# Patient Record
Sex: FEMALE | Race: ASIAN | NOT HISPANIC OR LATINO | Employment: FULL TIME | URBAN - METROPOLITAN AREA
[De-identification: names, ages, dates, MRNs, and addresses within clinical notes are randomized per-mention and may not be internally consistent; named-entity substitution may affect disease eponyms.]

---

## 2017-10-24 ENCOUNTER — OFFICE VISIT (OUTPATIENT)
Dept: FAMILY MEDICINE | Facility: CLINIC | Age: 32
End: 2017-10-24
Payer: OTHER GOVERNMENT

## 2017-10-24 ENCOUNTER — LAB VISIT (OUTPATIENT)
Dept: LAB | Facility: HOSPITAL | Age: 32
End: 2017-10-24
Attending: FAMILY MEDICINE
Payer: OTHER GOVERNMENT

## 2017-10-24 VITALS
WEIGHT: 104.19 LBS | SYSTOLIC BLOOD PRESSURE: 104 MMHG | HEART RATE: 56 BPM | BODY MASS INDEX: 19.67 KG/M2 | TEMPERATURE: 98 F | DIASTOLIC BLOOD PRESSURE: 76 MMHG | HEIGHT: 61 IN | OXYGEN SATURATION: 99 %

## 2017-10-24 DIAGNOSIS — Z01.419 ENCOUNTER FOR GYNECOLOGICAL EXAMINATION WITHOUT ABNORMAL FINDING: ICD-10-CM

## 2017-10-24 DIAGNOSIS — J31.0 RHINITIS, UNSPECIFIED CHRONICITY, UNSPECIFIED TYPE: ICD-10-CM

## 2017-10-24 DIAGNOSIS — Z00.00 ROUTINE PHYSICAL EXAMINATION: Primary | ICD-10-CM

## 2017-10-24 DIAGNOSIS — Z00.00 ROUTINE PHYSICAL EXAMINATION: ICD-10-CM

## 2017-10-24 DIAGNOSIS — Z23 NEED FOR IMMUNIZATION AGAINST INFLUENZA: ICD-10-CM

## 2017-10-24 LAB
ALBUMIN SERPL BCP-MCNC: 3.6 G/DL
ALP SERPL-CCNC: 30 U/L
ALT SERPL W/O P-5'-P-CCNC: 11 U/L
ANION GAP SERPL CALC-SCNC: 5 MMOL/L
AST SERPL-CCNC: 17 U/L
BASOPHILS # BLD AUTO: 0.04 K/UL
BASOPHILS NFR BLD: 0.9 %
BILIRUB SERPL-MCNC: 0.4 MG/DL
BUN SERPL-MCNC: 16 MG/DL
CALCIUM SERPL-MCNC: 8.8 MG/DL
CHLORIDE SERPL-SCNC: 111 MMOL/L
CHOLEST SERPL-MCNC: 160 MG/DL
CHOLEST/HDLC SERPL: 2.5 {RATIO}
CO2 SERPL-SCNC: 24 MMOL/L
CREAT SERPL-MCNC: 0.7 MG/DL
DIFFERENTIAL METHOD: ABNORMAL
EOSINOPHIL # BLD AUTO: 0.3 K/UL
EOSINOPHIL NFR BLD: 6.5 %
ERYTHROCYTE [DISTWIDTH] IN BLOOD BY AUTOMATED COUNT: 12.6 %
EST. GFR  (AFRICAN AMERICAN): >60 ML/MIN/1.73 M^2
EST. GFR  (NON AFRICAN AMERICAN): >60 ML/MIN/1.73 M^2
ESTIMATED AVG GLUCOSE: 97 MG/DL
GLUCOSE SERPL-MCNC: 98 MG/DL
HBA1C MFR BLD HPLC: 5 %
HCT VFR BLD AUTO: 40.7 %
HDLC SERPL-MCNC: 63 MG/DL
HDLC SERPL: 39.4 %
HGB BLD-MCNC: 13 G/DL
IMM GRANULOCYTES # BLD AUTO: 0 K/UL
IMM GRANULOCYTES NFR BLD AUTO: 0 %
LDLC SERPL CALC-MCNC: 84.2 MG/DL
LYMPHOCYTES # BLD AUTO: 1.3 K/UL
LYMPHOCYTES NFR BLD: 28.3 %
MCH RBC QN AUTO: 28.3 PG
MCHC RBC AUTO-ENTMCNC: 31.9 G/DL
MCV RBC AUTO: 89 FL
MONOCYTES # BLD AUTO: 0.5 K/UL
MONOCYTES NFR BLD: 10.5 %
NEUTROPHILS # BLD AUTO: 2.5 K/UL
NEUTROPHILS NFR BLD: 53.8 %
NONHDLC SERPL-MCNC: 97 MG/DL
NRBC BLD-RTO: 0 /100 WBC
PLATELET # BLD AUTO: 174 K/UL
PMV BLD AUTO: 11.2 FL
POTASSIUM SERPL-SCNC: 4.7 MMOL/L
PROT SERPL-MCNC: 7.2 G/DL
RBC # BLD AUTO: 4.6 M/UL
SODIUM SERPL-SCNC: 140 MMOL/L
T4 FREE SERPL-MCNC: 0.93 NG/DL
TRIGL SERPL-MCNC: 64 MG/DL
TSH SERPL DL<=0.005 MIU/L-ACNC: 0.74 UIU/ML
WBC # BLD AUTO: 4.59 K/UL

## 2017-10-24 PROCEDURE — 36415 COLL VENOUS BLD VENIPUNCTURE: CPT | Mod: PO

## 2017-10-24 PROCEDURE — 99395 PREV VISIT EST AGE 18-39: CPT | Mod: S$PBB,,, | Performed by: FAMILY MEDICINE

## 2017-10-24 PROCEDURE — 84439 ASSAY OF FREE THYROXINE: CPT

## 2017-10-24 PROCEDURE — 80061 LIPID PANEL: CPT

## 2017-10-24 PROCEDURE — 87480 CANDIDA DNA DIR PROBE: CPT

## 2017-10-24 PROCEDURE — 88175 CYTOPATH C/V AUTO FLUID REDO: CPT

## 2017-10-24 PROCEDURE — 85025 COMPLETE CBC W/AUTO DIFF WBC: CPT

## 2017-10-24 PROCEDURE — 99999 PR PBB SHADOW E&M-EST. PATIENT-LVL III: CPT | Mod: PBBFAC,,, | Performed by: FAMILY MEDICINE

## 2017-10-24 PROCEDURE — 87660 TRICHOMONAS VAGIN DIR PROBE: CPT

## 2017-10-24 PROCEDURE — 80053 COMPREHEN METABOLIC PANEL: CPT

## 2017-10-24 PROCEDURE — 87624 HPV HI-RISK TYP POOLED RSLT: CPT

## 2017-10-24 PROCEDURE — G0008 ADMIN INFLUENZA VIRUS VAC: HCPCS | Mod: PBBFAC,PO

## 2017-10-24 PROCEDURE — 99213 OFFICE O/P EST LOW 20 MIN: CPT | Mod: PBBFAC,PO | Performed by: FAMILY MEDICINE

## 2017-10-24 PROCEDURE — 83036 HEMOGLOBIN GLYCOSYLATED A1C: CPT

## 2017-10-24 PROCEDURE — 84443 ASSAY THYROID STIM HORMONE: CPT

## 2017-10-24 RX ORDER — LORATADINE 10 MG/1
10 TABLET ORAL DAILY PRN
Qty: 30 TABLET | Refills: 3 | Status: SHIPPED | OUTPATIENT
Start: 2017-10-24 | End: 2017-11-08

## 2017-10-24 NOTE — PROGRESS NOTES
[facility]  Progress Note    SUBJECTIVE:     Chief Complaint   Patient presents with    Annual Exam       HPI   Jenna Ferreira  is a 32 y.o. female here for routine physical exam. Patient has no other new complaints/problems at this time.      Review of patient's allergies indicates:  No Known Allergies    Past Medical History:   Diagnosis Date    Hx of ectopic pregnancy 11/26/13    Ruptured right ectopic pregnancy     Past Surgical History:   Procedure Laterality Date    DILATION AND CURETTAGE OF UTERUS  2008    TAB    DILATION AND CURETTAGE OF UTERUS  2010    TAB    PELVIC LAPAROSCOPY Right 11/26/13    ruptured ectopic pregnancy    SALPINGECTOMY Right 11/26/13    Ruptured Ectopic pregnancy     Family History   Problem Relation Age of Onset    Hypertension Maternal Grandmother     Hyperlipidemia Maternal Grandmother     No Known Problems Paternal Grandfather     No Known Problems Paternal Grandmother     No Known Problems Maternal Grandfather     No Known Problems Father     No Known Problems Mother     No Known Problems Sister     No Known Problems Brother     No Known Problems Maternal Aunt     No Known Problems Maternal Uncle     No Known Problems Paternal Aunt     No Known Problems Paternal Uncle     Cancer Neg Hx     Amblyopia Neg Hx     Blindness Neg Hx     Cataracts Neg Hx     Diabetes Neg Hx     Glaucoma Neg Hx     Macular degeneration Neg Hx     Retinal detachment Neg Hx     Strabismus Neg Hx     Stroke Neg Hx     Thyroid disease Neg Hx      Social History   Substance Use Topics    Smoking status: Never Smoker    Smokeless tobacco: Never Used    Alcohol use No        Review of Systems   Constitutional: Negative for chills, diaphoresis and fever.   HENT: Negative for congestion, ear pain and sore throat.    Eyes: Negative for photophobia and discharge.   Respiratory: Negative for cough, shortness of breath and wheezing.    Cardiovascular: Negative for chest pain and  palpitations.   Gastrointestinal: Negative for abdominal pain, constipation, diarrhea, nausea and vomiting.   Genitourinary: Negative for dysuria and hematuria.   Musculoskeletal: Negative for back pain and myalgias.   Skin: Negative for itching and rash.   Neurological: Negative for dizziness, sensory change, focal weakness, weakness and headaches.   All other systems reviewed and are negative.    OBJECTIVE:     Vitals:    10/24/17 0916   BP: 104/76   Pulse: (!) 56   Temp: 97.9 °F (36.6 °C)     Body mass index is 20.01 kg/m².    Physical Exam   Constitutional: She is oriented to person, place, and time and well-developed, well-nourished, and in no distress. No distress.   HENT:   Head: Normocephalic and atraumatic.   Right Ear: Tympanic membrane is not perforated, not erythematous and not bulging. No hemotympanum.   Left Ear: Tympanic membrane is not perforated, not erythematous and not bulging. No hemotympanum.   Mouth/Throat: Oropharynx is clear and moist. No oropharyngeal exudate.   Eyes: Conjunctivae and EOM are normal. Pupils are equal, round, and reactive to light.   Neck: No thyromegaly present.   Cardiovascular: Normal rate, regular rhythm and normal heart sounds.  Exam reveals no gallop and no friction rub.    No murmur heard.  Pulmonary/Chest: Effort normal and breath sounds normal. No respiratory distress. She has no wheezes. She has no rales.   Abdominal: Soft. Bowel sounds are normal. She exhibits no distension. There is no tenderness. There is no rebound and no guarding.   Genitourinary: Uterus normal, cervix normal, right adnexa normal and left adnexa normal. Vaginal discharge (scant, whitish vaginal discharge. ) found.   Musculoskeletal: Normal range of motion. She exhibits no edema or tenderness.   Lymphadenopathy:     She has no cervical adenopathy.   Neurological: She is alert and oriented to person, place, and time.   Skin: Skin is warm. No rash noted. She is not diaphoretic. No erythema.      Protective Sensation (w/ 10 gram monofilament):  Right: Intact  Left: Intact    Visual Inspection:  Normal -  Bilateral    Pedal Pulses:   Right: Present  Left: Present    Posterior tibialis:   Right:Present  Left: Present      Old records were reviewed. Labs and/or images were independently reviewed.    ASSESSMENT     1. Routine physical examination    2. Rhinitis, unspecified chronicity, unspecified type    3. Need for immunization against influenza    4. Encounter for gynecological examination without abnormal finding        PLAN:     Routine physical examination  -     CBC auto differential; Future  -     Comprehensive metabolic panel; Future  -     Hemoglobin A1c; Future  -     Lipid panel; Future  -     TSH; Future  -     T4, free; Future  -     We briefly discussed diet, exercise, and routine preventive exams. All questions and comments addressed.  -     PAP smear done today. Sent vaginal discharge for vaginosis screen.    Rhinitis, unspecified chronicity, unspecified type  -     loratadine (CLARITIN) 10 mg tablet; Take 1 tablet (10 mg total) by mouth daily as needed for Allergies.  Dispense: 30 tablet; Refill: 3    Need for immunization against influenza   -   Flu shot today      RTC PRN    Mook Ferreira MD  10/24/2017 9:35 AM

## 2017-10-24 NOTE — PROGRESS NOTES
Influenza vaccine administered, shayla well. Instructed to wait 15mins for observation, no reaction noted @ time of discharge.

## 2017-10-25 LAB
CANDIDA RRNA VAG QL PROBE: NEGATIVE
G VAGINALIS RRNA GENITAL QL PROBE: NEGATIVE
T VAGINALIS RRNA GENITAL QL PROBE: NEGATIVE

## 2017-10-30 LAB
HPV16 AG SPEC QL: NEGATIVE
HPV16+18+H RISK 12 DNA CVX-IMP: NEGATIVE
HPV18 DNA SPEC QL NAA+PROBE: NEGATIVE

## 2017-11-08 ENCOUNTER — OFFICE VISIT (OUTPATIENT)
Dept: OPTOMETRY | Facility: CLINIC | Age: 32
End: 2017-11-08
Payer: OTHER GOVERNMENT

## 2017-11-08 DIAGNOSIS — G51.4 EYELID MYOKYMIA: ICD-10-CM

## 2017-11-08 DIAGNOSIS — Z01.00 EXAMINATION OF EYES AND VISION: Primary | ICD-10-CM

## 2017-11-08 DIAGNOSIS — H52.13 MYOPIC ASTIGMATISM, BILATERAL: ICD-10-CM

## 2017-11-08 DIAGNOSIS — H52.203 MYOPIC ASTIGMATISM, BILATERAL: ICD-10-CM

## 2017-11-08 PROCEDURE — 99211 OFF/OP EST MAY X REQ PHY/QHP: CPT | Mod: PBBFAC,PO | Performed by: OPTOMETRIST

## 2017-11-08 PROCEDURE — 92014 COMPRE OPH EXAM EST PT 1/>: CPT | Mod: S$PBB,,, | Performed by: OPTOMETRIST

## 2017-11-08 PROCEDURE — 99999 PR PBB SHADOW E&M-EST. PATIENT-LVL I: CPT | Mod: PBBFAC,,, | Performed by: OPTOMETRIST

## 2017-11-08 PROCEDURE — 92015 DETERMINE REFRACTIVE STATE: CPT | Mod: ,,, | Performed by: OPTOMETRIST

## 2017-11-08 NOTE — PROGRESS NOTES
Subjective:       Patient ID: Anne Arenas is a 32 y.o. female      Chief Complaint   Patient presents with    Concerns About Ocular Health     History of Present Illness  Dls: 10/31/16 Dr. Walker    Pt states no changes in vision. Pt wears single vision glasses. Pt c/o lid   twitching od x 6 months on and off. Pt c/o itching ou off/on no tearing no   burning no pain no ha'sno floaters.     Eye meds:   Tears ou prn      Assessment/Plan:     1. Examination of eyes and vision  Good ocular health OU    2. Eyelid myokymia  Reassured patient. Discussed cold compress prn for comfort, and potential causes related to stress, fatigue, and excessive caffeine.    3. Myopic astigmatism, bilateral  Educated patient on refractive error and discussed lens options. Dispensed updated spectacle Rx. Educated about adaptation period to new specs.    Eyeglass Final Rx     Eyeglass Final Rx       Sphere Cylinder Axis    Right -5.75 +1.00 090    Left -5.75 +1.00 115    Type:  JULIET    Expiration Date:  11/9/2018                Return in about 1 year (around 11/8/2018) for Annual eye exam.

## 2020-05-06 ENCOUNTER — TELEPHONE (OUTPATIENT)
Dept: OBSTETRICS AND GYNECOLOGY | Facility: CLINIC | Age: 35
End: 2020-05-06

## 2020-05-06 NOTE — TELEPHONE ENCOUNTER
Appointment given.    ----- Message from Leonor Okeefe sent at 5/6/2020 11:51 AM CDT -----  Contact: Self  [phone]  Type:  Patient Returning Call    Who Called: Self    Who Left Message for Patient: Sarina    Does the patient know what this is regarding?: yes    Would the patient rather a call back or a response via [facility]? Call back    Best Call Back Number: [phone]

## 2020-05-06 NOTE — TELEPHONE ENCOUNTER
No answer, left message.      ----- Message from Irving Jasso sent at 5/6/2020 11:13 AM CDT -----  Contact: Henri () [phone]  New OB    Can the clinic reply in [facility]: no    Who Called: Henri    Date of Positive Preg Test: not sure, but stated that the patient is about 3 months pregnant    1st day of Last Menstrual Cycle: not sure    List Any Difficulties: coughing and light bleeding     Preferred Provider:Dr. Mendiola     What Number to Call Back: the patient's  stated to call the patient at [phone]

## 2020-05-06 NOTE — TELEPHONE ENCOUNTER
Appointment given.    ----- Message from Fabby Brewer sent at 5/6/2020  4:16 PM CDT -----  Contact: Dennise Arenas [phone]  Type:  Patient Returning Call    Who Called:  Henri Arenas    Who Left Message for Patient:  Sarina    Does the patient know what this is regarding?: Pregnancy-blood    Would the patient rather a call back or a response via [facility]? Call back    Best Call Back Number: [phone]    Additional Information: Patient missed the nurse's call.

## 2020-05-25 ENCOUNTER — LAB VISIT (OUTPATIENT)
Dept: LAB | Facility: HOSPITAL | Age: 35
End: 2020-05-25
Attending: OBSTETRICS & GYNECOLOGY
Payer: MEDICAID

## 2020-05-25 ENCOUNTER — OFFICE VISIT (OUTPATIENT)
Dept: OBSTETRICS AND GYNECOLOGY | Facility: CLINIC | Age: 35
End: 2020-05-25
Payer: MEDICAID

## 2020-05-25 VITALS
WEIGHT: 115.5 LBS | DIASTOLIC BLOOD PRESSURE: 62 MMHG | SYSTOLIC BLOOD PRESSURE: 102 MMHG | HEIGHT: 60 IN | BODY MASS INDEX: 22.68 KG/M2

## 2020-05-25 DIAGNOSIS — N92.6 MISSED PERIOD: ICD-10-CM

## 2020-05-25 DIAGNOSIS — Z32.01 POSITIVE PREGNANCY TEST: ICD-10-CM

## 2020-05-25 DIAGNOSIS — Z3A.15 15 WEEKS GESTATION OF PREGNANCY: Primary | ICD-10-CM

## 2020-05-25 DIAGNOSIS — Z3A.15 15 WEEKS GESTATION OF PREGNANCY: ICD-10-CM

## 2020-05-25 DIAGNOSIS — O09.522 ADVANCED MATERNAL AGE IN MULTIGRAVIDA, SECOND TRIMESTER: ICD-10-CM

## 2020-05-25 LAB
ABO + RH BLD: NORMAL
ALBUMIN SERPL BCP-MCNC: 2.9 G/DL (ref 3.5–5.2)
ALP SERPL-CCNC: 30 U/L (ref 55–135)
ALT SERPL W/O P-5'-P-CCNC: 17 U/L (ref 10–44)
ANION GAP SERPL CALC-SCNC: 7 MMOL/L (ref 8–16)
AST SERPL-CCNC: 18 U/L (ref 10–40)
B-HCG UR QL: POSITIVE
BASOPHILS # BLD AUTO: 0.02 K/UL (ref 0–0.2)
BASOPHILS NFR BLD: 0.2 % (ref 0–1.9)
BILIRUB SERPL-MCNC: 0.3 MG/DL (ref 0.1–1)
BLD GP AB SCN CELLS X3 SERPL QL: NORMAL
BUN SERPL-MCNC: 11 MG/DL (ref 6–20)
CALCIUM SERPL-MCNC: 8.2 MG/DL (ref 8.7–10.5)
CHLORIDE SERPL-SCNC: 107 MMOL/L (ref 95–110)
CO2 SERPL-SCNC: 22 MMOL/L (ref 23–29)
CREAT SERPL-MCNC: 0.6 MG/DL (ref 0.5–1.4)
CREAT UR-MCNC: 76.4 MG/DL (ref 15–325)
CTP QC/QA: YES
DIFFERENTIAL METHOD: ABNORMAL
EOSINOPHIL # BLD AUTO: 0.4 K/UL (ref 0–0.5)
EOSINOPHIL NFR BLD: 4 % (ref 0–8)
ERYTHROCYTE [DISTWIDTH] IN BLOOD BY AUTOMATED COUNT: 12.9 % (ref 11.5–14.5)
EST. GFR  (AFRICAN AMERICAN): >60 ML/MIN/1.73 M^2
EST. GFR  (NON AFRICAN AMERICAN): >60 ML/MIN/1.73 M^2
GLUCOSE SERPL-MCNC: 87 MG/DL (ref 70–110)
HCT VFR BLD AUTO: 38.8 % (ref 37–48.5)
HGB BLD-MCNC: 12.1 G/DL (ref 12–16)
IMM GRANULOCYTES # BLD AUTO: 0.06 K/UL (ref 0–0.04)
IMM GRANULOCYTES NFR BLD AUTO: 0.7 % (ref 0–0.5)
LYMPHOCYTES # BLD AUTO: 1.2 K/UL (ref 1–4.8)
LYMPHOCYTES NFR BLD: 13 % (ref 18–48)
MCH RBC QN AUTO: 28.3 PG (ref 27–31)
MCHC RBC AUTO-ENTMCNC: 31.2 G/DL (ref 32–36)
MCV RBC AUTO: 91 FL (ref 82–98)
MONOCYTES # BLD AUTO: 0.8 K/UL (ref 0.3–1)
MONOCYTES NFR BLD: 9.3 % (ref 4–15)
NEUTROPHILS # BLD AUTO: 6.6 K/UL (ref 1.8–7.7)
NEUTROPHILS NFR BLD: 72.8 % (ref 38–73)
NRBC BLD-RTO: 0 /100 WBC
PLATELET # BLD AUTO: 190 K/UL (ref 150–350)
PMV BLD AUTO: 9.7 FL (ref 9.2–12.9)
POTASSIUM SERPL-SCNC: 3.8 MMOL/L (ref 3.5–5.1)
PROT SERPL-MCNC: 6.3 G/DL (ref 6–8.4)
PROT UR-MCNC: <7 MG/DL
PROT/CREAT UR: NORMAL MG/G{CREAT} (ref 0–0.2)
RBC # BLD AUTO: 4.28 M/UL (ref 4–5.4)
SODIUM SERPL-SCNC: 136 MMOL/L (ref 136–145)
T4 FREE SERPL-MCNC: 0.96 NG/DL (ref 0.71–1.51)
TSH SERPL DL<=0.005 MIU/L-ACNC: 0.02 UIU/ML (ref 0.4–4)
WBC # BLD AUTO: 9.03 K/UL (ref 3.9–12.7)

## 2020-05-25 PROCEDURE — 82570 ASSAY OF URINE CREATININE: CPT

## 2020-05-25 PROCEDURE — 80053 COMPREHEN METABOLIC PANEL: CPT

## 2020-05-25 PROCEDURE — 87491 CHLMYD TRACH DNA AMP PROBE: CPT

## 2020-05-25 PROCEDURE — 99214 PR OFFICE/OUTPT VISIT, EST, LEVL IV, 30-39 MIN: ICD-10-PCS | Mod: S$PBB,TH,, | Performed by: OBSTETRICS & GYNECOLOGY

## 2020-05-25 PROCEDURE — 81025 URINE PREGNANCY TEST: CPT | Mod: PBBFAC | Performed by: OBSTETRICS & GYNECOLOGY

## 2020-05-25 PROCEDURE — 86803 HEPATITIS C AB TEST: CPT

## 2020-05-25 PROCEDURE — 99213 OFFICE O/P EST LOW 20 MIN: CPT | Mod: PBBFAC,TH,25 | Performed by: OBSTETRICS & GYNECOLOGY

## 2020-05-25 PROCEDURE — 99999 PR PBB SHADOW E&M-EST. PATIENT-LVL III: ICD-10-PCS | Mod: PBBFAC,,, | Performed by: OBSTETRICS & GYNECOLOGY

## 2020-05-25 PROCEDURE — 86901 BLOOD TYPING SEROLOGIC RH(D): CPT

## 2020-05-25 PROCEDURE — 83020 HEMOGLOBIN ELECTROPHORESIS: CPT

## 2020-05-25 PROCEDURE — 87340 HEPATITIS B SURFACE AG IA: CPT

## 2020-05-25 PROCEDURE — 81511 FTL CGEN ABNOR FOUR ANAL: CPT

## 2020-05-25 PROCEDURE — 86703 HIV-1/HIV-2 1 RESULT ANTBDY: CPT

## 2020-05-25 PROCEDURE — 36415 COLL VENOUS BLD VENIPUNCTURE: CPT

## 2020-05-25 PROCEDURE — 83036 HEMOGLOBIN GLYCOSYLATED A1C: CPT

## 2020-05-25 PROCEDURE — 99999 PR PBB SHADOW E&M-EST. PATIENT-LVL III: CPT | Mod: PBBFAC,,, | Performed by: OBSTETRICS & GYNECOLOGY

## 2020-05-25 PROCEDURE — 99214 OFFICE O/P EST MOD 30 MIN: CPT | Mod: S$PBB,TH,, | Performed by: OBSTETRICS & GYNECOLOGY

## 2020-05-25 PROCEDURE — 87086 URINE CULTURE/COLONY COUNT: CPT

## 2020-05-25 PROCEDURE — 84443 ASSAY THYROID STIM HORMONE: CPT

## 2020-05-25 PROCEDURE — 85025 COMPLETE CBC W/AUTO DIFF WBC: CPT

## 2020-05-25 PROCEDURE — 86592 SYPHILIS TEST NON-TREP QUAL: CPT

## 2020-05-25 PROCEDURE — 84439 ASSAY OF FREE THYROXINE: CPT

## 2020-05-25 PROCEDURE — 86762 RUBELLA ANTIBODY: CPT

## 2020-05-25 RX ORDER — METRONIDAZOLE 7.5 MG/G
GEL VAGINAL
COMMUNITY
Start: 2020-05-19 | End: 2020-06-22 | Stop reason: ALTCHOICE

## 2020-05-25 RX ORDER — PNV NO.95/FERROUS FUM/FOLIC AC 28MG-0.8MG
TABLET ORAL
COMMUNITY
Start: 2020-05-19 | End: 2020-10-26 | Stop reason: SDUPTHER

## 2020-05-25 RX ORDER — FERROUS GLUCONATE 324(38)MG
TABLET ORAL
COMMUNITY
Start: 2020-05-19 | End: 2020-10-26 | Stop reason: SDUPTHER

## 2020-05-25 NOTE — PROGRESS NOTES
Subjective:       Patient ID: Anne Arenas is a 35 y.o. female.    Chief Complaint:  Confirmation (NP here for confirmation.  Pt not sure when her LMP was but thinks she is about 4 months.)      History of Present Illness  HPI  Missed Menses/ Possible Pregnancy  Patient complains of positive home pregnancy test recently. She believes she could be pregnant. Pregnancy is desired. Sexual Activity: single partner, contraception: none. Current symptoms also include: breast tenderness, fatigue and positive home pregnancy test. Last period was normal.     No LMP recorded. Patient is pregnant.     Status post spontaneous  on 2019  Previous Ectopic pregnancy in 2013      GYN & OB History  No LMP recorded. Patient is pregnant.   Date of Last Pap: 10/28/2017    OB History    Para Term  AB Living   5       3     SAB TAB Ectopic Multiple Live Births     2 1          # Outcome Date GA Lbr She[place]on/2nd Weight Sex Delivery Anes PTL Lv   5 Current            4 Ectopic 13           3 TAB 2010           2 TAB 2008           1               Past Medical History:   Diagnosis Date    Hx of ectopic pregnancy 13    Ruptured right ectopic pregnancy       Past Surgical History:   Procedure Laterality Date    DILATION AND CURETTAGE OF UTERUS  2008    TAB    DILATION AND CURETTAGE OF UTERUS  2010    TAB    PELVIC LAPAROSCOPY Right 13    ruptured ectopic pregnancy    SALPINGECTOMY Right 13    Ruptured Ectopic pregnancy       Family History   Problem Relation Age of Onset    Hypertension Maternal Grandmother     Hyperlipidemia Maternal Grandmother     No Known Problems Paternal Grandfather     No Known Problems Paternal Grandmother     No Known Problems Maternal Grandfather     No Known Problems Father     No Known Problems Mother     No Known Problems Sister     No Known Problems Brother     No Known Problems Maternal Aunt     No Known Problems Maternal Uncle      No Known Problems Paternal Aunt     No Known Problems Paternal Uncle     Cancer Neg Hx     Amblyopia Neg Hx     Blindness Neg Hx     Cataracts Neg Hx     Diabetes Neg Hx     Glaucoma Neg Hx     Macular degeneration Neg Hx     Retinal detachment Neg Hx     Strabismus Neg Hx     Stroke Neg Hx     Thyroid disease Neg Hx        Social History     Socioeconomic History    Marital status:      Spouse name: Not on file    Number of children: 0    Years of education: Not on file    Highest education level: Not on file   Occupational History     Employer: [employer]     Comment:    Social Needs    Financial resource strain: Not on file    Food insecurity:     Worry: Not on file     Inability: Not on file    Transportation needs:     Medical: Not on file     Non-medical: Not on file   Tobacco Use    Smoking status: Never Smoker    Smokeless tobacco: Never Used   Substance and Sexual Activity    Alcohol use: No    Drug use: No    Sexual activity: Yes     Partners: Male     Birth control/protection: None   Lifestyle    Physical activity:     Days per week: Not on file     Minutes per session: Not on file    Stress: Not on file   Relationships    Social connections:     Talks on phone: Not on file     Gets together: Not on file     Attends Sabianism service: Not on file     Active member of club or organization: Not on file     Attends meetings of clubs or organizations: Not on file     Relationship status: Not on file   Other Topics Concern    Not on file   Social History Narrative    Together  2009    He is an     She is a beautician       Current Outpatient Medications   Medication Sig Dispense Refill    ferrous gluconate (FERGON) 324 MG tablet       metroNIDAZOLE (METROGEL) 0.75 % vaginal gel INSERT 1 APPLICATORFUL VAGINALLY QHS FOR 7 DAYS      PRENATAL MULTIVITAMINS 28 mg iron- 800 mcg Tab        No current facility-administered medications for this visit.         Review of patient's allergies indicates:  No Known Allergies    Review of Systems  Review of Systems   Constitutional: Positive for fatigue. Negative for activity change, appetite change, fever and unexpected weight change.   Respiratory: Negative for cough, shortness of breath and wheezing.    Cardiovascular: Negative for chest pain and palpitations.   Gastrointestinal: Positive for abdominal pain and nausea. Negative for vomiting.   Endocrine: Negative for hot flashes.   Genitourinary: Positive for frequency, pelvic pain and vaginal discharge. Negative for dysmenorrhea, dyspareunia, urgency, vaginal bleeding and postcoital bleeding.   Musculoskeletal: Positive for back pain. Negative for myalgias.   Integumentary:  Negative for rash, breast mass and nipple discharge.   Neurological: Positive for headaches. Negative for seizures.   Psychiatric/Behavioral: Negative for depression and sleep disturbance. The patient is not nervous/anxious.    Breast: Negative for mass, mastodynia and nipple discharge          Objective:    Physical Exam:   Constitutional: She appears well-developed and well-nourished. No distress.    HENT:   Head: Normocephalic and atraumatic.    Eyes: EOM are normal.    Neck: Normal range of motion.     Pulmonary/Chest: Effort normal. No respiratory distress.   Breasts: Non-tender, no engorgement, no masses, no retraction, no discharge. Negative for lymphadenopathy.         Abdominal: Soft. She exhibits no distension. There is no tenderness. There is no rebound and no guarding.   FH- 16 cm  FHT- 145     Genitourinary: Vagina normal. No vaginal discharge found.   Genitourinary Comments: Vulva without any obvious lesions.  Urethral meatus normal size and location without any lesion.  Urethra is non-tender without stricture or discharge.  Bladder is non-tender.  Vaginal vault with good support.  Minimal white discharge noted.  No obvious lesion.  Normal rugation.  Cervix is without any cervical  motion tenderness.  No obvious lesion.  Minimal spotting at os.  Uterus is about 16 weeks, non-tender, normal contour.  Adnexa is without any masses or tenderness.  Perineum without obvious lesion.             Musculoskeletal: Normal range of motion.       Neurological: She is alert.    Skin: Skin is warm and dry.    Psychiatric: She has a normal mood and affect.        A transabdominal ultrasound performed showing normal ?male fetus at appropriate size.  EGA: 15w2d  EDC: 11/14/2020  No obvious anatomic anomalies noted.  Placenta anterior  Patient reassured       Assessment:        1. 15 weeks gestation of pregnancy    2. Missed period    3. Positive pregnancy test    4. Advanced maternal age in multigravida, second trimester              Plan:      I have discussed with the patient her condition  She is doing well so far in her early pregnancy  She is taking over-the-counter prenatal vitamins; she will continue  Gonorrhea and chlamydia performed  We will contact her insurance for maternity benefits  She will be back in about 2-4 weeks for follow-up and ultrasound    We discussed management plan for Advanced Maternal Age.  1.  MFM evaluation   2.  Baby aspirin should begin at around 12-14 weeks, she should start taking it now.  3.  Ultrasounds at next visit, at 20 weeks, then again at 32 weeks for growth and fluid with MFM.  4.  Deliver by 39-40 weeks.

## 2020-05-26 LAB
ESTIMATED AVG GLUCOSE: 91 MG/DL (ref 68–131)
HBA1C MFR BLD HPLC: 4.8 % (ref 4–5.6)
HBV SURFACE AG SERPL QL IA: NEGATIVE
HCV AB SERPL QL IA: NEGATIVE
HIV 1+2 AB+HIV1 P24 AG SERPL QL IA: NEGATIVE
RPR SER QL: NORMAL
RUBV IGG SER-ACNC: 26.3 IU/ML
RUBV IGG SER-IMP: REACTIVE

## 2020-05-27 LAB
BACTERIA UR CULT: NORMAL
C TRACH DNA SPEC QL NAA+PROBE: NOT DETECTED
N GONORRHOEA DNA SPEC QL NAA+PROBE: NOT DETECTED

## 2020-05-28 LAB
# FETUSES US: NORMAL
2ND TRIMESTER 4 SCREEN PNL SERPL: NEGATIVE
2ND TRIMESTER 4 SCREEN SERPL-IMP: NORMAL
AFP MOM SERPL: 1.42
AFP SERPL-MCNC: 48.1 NG/ML
AGE AT DELIVERY: 35
B-HCG MOM SERPL: 2.88
B-HCG SERPL-ACNC: 157.4 IU/ML
FET TS 21 RISK FROM MAT AGE: NORMAL
GA (DAYS): 2 D
GA (WEEKS): 15 WK
GA METHOD: NORMAL
HGB A MFR BLD ELPH: 95.6 % (ref 95.8–98)
HGB A2 MFR BLD: 3.1 % (ref 2–3.3)
HGB F MFR BLD: 1.3 % (ref 0–0.9)
HGB FRACT BLD ELPH-IMP: ABNORMAL
HGB OTHER MFR BLD ELPH: 0 %
IDDM PATIENT QL: NORMAL
INHIBIN A MOM SERPL: 1.63
INHIBIN A SERPL-MCNC: 325 PG/ML
SMOKING STATUS FTND: NO
THEVP VARIANT 2: ABNORMAL
THEVP VARIANT 3: ABNORMAL
TS 18 RISK FETUS: NORMAL
TS 21 RISK FETUS: NORMAL
U ESTRIOL MOM SERPL: 0.82
U ESTRIOL SERPL-MCNC: 0.62 NG/ML

## 2020-06-01 ENCOUNTER — INITIAL CONSULT (OUTPATIENT)
Dept: MATERNAL FETAL MEDICINE | Facility: CLINIC | Age: 35
End: 2020-06-01
Payer: MEDICAID

## 2020-06-01 ENCOUNTER — PROCEDURE VISIT (OUTPATIENT)
Dept: MATERNAL FETAL MEDICINE | Facility: CLINIC | Age: 35
End: 2020-06-01
Payer: MEDICAID

## 2020-06-01 VITALS
DIASTOLIC BLOOD PRESSURE: 63 MMHG | SYSTOLIC BLOOD PRESSURE: 103 MMHG | BODY MASS INDEX: 22.48 KG/M2 | WEIGHT: 115.06 LBS

## 2020-06-01 DIAGNOSIS — O09.522 ADVANCED MATERNAL AGE IN MULTIGRAVIDA, SECOND TRIMESTER: ICD-10-CM

## 2020-06-01 DIAGNOSIS — Z3A.15 15 WEEKS GESTATION OF PREGNANCY: ICD-10-CM

## 2020-06-01 DIAGNOSIS — Z32.01 POSITIVE PREGNANCY TEST: ICD-10-CM

## 2020-06-01 DIAGNOSIS — Z36.89 ENCOUNTER FOR FETAL ANATOMIC SURVEY: Primary | ICD-10-CM

## 2020-06-01 PROCEDURE — 76815 PR  US,PREGNANT UTERUS,LIMITED, 1/> FETUSES: ICD-10-PCS | Mod: 26,S$PBB,, | Performed by: OBSTETRICS & GYNECOLOGY

## 2020-06-01 PROCEDURE — 76815 OB US LIMITED FETUS(S): CPT | Mod: PBBFAC,PO | Performed by: OBSTETRICS & GYNECOLOGY

## 2020-06-01 PROCEDURE — 99999 PR PBB SHADOW E&M-EST. PATIENT-LVL II: CPT | Mod: PBBFAC,,, | Performed by: OBSTETRICS & GYNECOLOGY

## 2020-06-01 PROCEDURE — 99204 OFFICE O/P NEW MOD 45 MIN: CPT | Mod: 25,S$PBB,TH, | Performed by: OBSTETRICS & GYNECOLOGY

## 2020-06-01 PROCEDURE — 99204 PR OFFICE/OUTPT VISIT, NEW, LEVL IV, 45-59 MIN: ICD-10-PCS | Mod: 25,S$PBB,TH, | Performed by: OBSTETRICS & GYNECOLOGY

## 2020-06-01 PROCEDURE — 76815 OB US LIMITED FETUS(S): CPT | Mod: 26,S$PBB,, | Performed by: OBSTETRICS & GYNECOLOGY

## 2020-06-01 PROCEDURE — 99212 OFFICE O/P EST SF 10 MIN: CPT | Mod: PBBFAC,TH,PO | Performed by: OBSTETRICS & GYNECOLOGY

## 2020-06-01 PROCEDURE — 99999 PR PBB SHADOW E&M-EST. PATIENT-LVL II: ICD-10-PCS | Mod: PBBFAC,,, | Performed by: OBSTETRICS & GYNECOLOGY

## 2020-06-01 NOTE — PROGRESS NOTES
Indication  ========    Consultation: Estimation of gestational age. Limited fetal anatomy survey    History  ======    Risk Factors  History risk factors: AMA    Pregnancy History  ==============    Maternal Lab Tests  Test: quad screen  Result:    DSR 1:480  T18 [id number]:[id number]    Maternal Assessment  =================    Weight 52 kg  Weight (lb) 115 lb  BP syst 103 mmHg  BP diast 63 mmHg    Method  ======    Transabdominal ultrasound examination, 2D Color Doppler, Voluson S8. View: Suboptimal view: limited by early gestational age    Pregnancy  =========    [place] pregnancy. Number of fetuses: 1    Dating  ======    Ultrasound examination on: 6/1/2020  GA by U/S based upon: AC, BPD, Femur, HC  GA by U/S 16 w + 2 d  JUAN by U/S: 11/14/2020  Assigned: based on ultrasound (AC, BPD, Femur, HC), selected on 06/1/2020  Assigned GA 16 w + 2 d  Assigned JUAN: 11/14/2020  Pregnancy length 280 d    General Evaluation  ==============    Cardiac activity present.  bpm.  Fetal movements visualized.  Presentation cephalic.  Placenta posterior, fundal.  Umbilical cord 3 vessel cord, marginal insertion at fundus.  Amniotic fluid normal amount.    Fetal Biometry  ============    Standard  BPD 34.4 mm  16w 4d                Hadlock    OFD 43.5 mm  16w 6d                Jose    .7 mm  16w 2d                Hadlock    .1 mm  16w 4d                Hadlock    Femur 18.3 mm  15w 3d                Hadlock    Humerus 18.3 mm  15w 2d                Jose    HC / AC 1.18     g    EFW (lb) 0 lb  EFW (oz) 5 oz  EFW by: Hadlock (BPD-HC-AC-FL)  Head / Face / Neck  Cephalic index 0.79    Extremities / Bony Struc  FL / BPD 0.53    FL / HC 0.15    FL / AC 0.17    Other Structures   bpm    Fetal Anatomy  ============    Cranium: normal  Stomach: normal  Kidneys: normal  Bladder: normal  Arms: both visualized  Legs: both visualized    Maternal Structures  ===============    Uterus / Cervix  Cervical length 39.6  mm  Ovaries / Tubes / Adnexa  Rt ovary: Not visualized  Lt ovary: Normal    Consultation  ==========    Type: AMA  The patient is a 35-year-old  who is currently at 16 and 2 weeks gestation based on today's ultrasound who presents as consultation  for advanced maternal age. The patient had a bedside ultrasound in her obstetrician's office which gave her the same JUAN and that was what  was used for calculation of her quad screen previously.    Past medical history: Advanced maternal age  past surgical history: Ectopic pregnancy surgery per chart right salpingectomy, patient could not confirm exactly what surgery she had had, in  the chart there is listed as 2 D&Cs, the patient did not mention having a D&C.  No known drug allergies  medications: Prenatal vitamins  social history: Denies smoking, alcohol, illicit drug use  family history of birth defects: None  past OB history: In  she had 2 pregnancy terminations per chart via D&C patient did not appear to want to discuss, she had an ectopic  pregnancy in  that per the chart was a right salpingectomy (laparoscopic) patient was unable to give much with respect to details, she had  a miscarriage subsequently without a D&C, she had a term vacuum assisted delivery with 4th degree laceration and was readmitted for  bacteremia and pyelonephritis although urine culture was negative    quad screen was low risk but HCG was 2.88 MoM which is slightly increased  TSH 0.017, free T4 0.96  A1c 4.8  increase hemoglobin F on electrophoresis  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 IUP at 16 and 2    2. AMA: The patient is of advanced maternal age. We discussed the potential consequences associated with advanced maternal age.    -We discussed the increased risk of chromosomal abnormalities with advanced maternal age and discussed the patients age related risk of  chromosomal abnormalities. We discussed screening strategies to assess  for chromosomal abnormalities.  We reviewed the risks, benefits, alternatives, limitations, and sensitivities of the quad screen. We discussed the risks, benefits, alternatives,  limitations, sensitivities of NIPT. We also discussed diagnostic strategies to assess for chromosomal abnormalities. The patient was advised of  the risks, benefits, alternatives, and limitations of amniocentesis and the 1 in 1000 risk of pregnancy related complications including pregnancy  loss and declined. We discussed the limitations of ultrasound in detecting chromosomal abnormalities such as Trisomy 21. The patient had a  low risk quad screening currently declines NIPT or amniocentesis. She will further discuss NIPT with her primary obstetrician. We discussed  that the ultrasound today is limited given her early gestational age. The femur and humerus measure a week behind her composite gestational  age but this is her 1st formal ultrasound and is still likely within the range of error but will be reassessed on follow up. The JUAN does match  with a limited bedside ultrasound that her primary obstetrician did and with the dating that he used for the quad screen.  -We also reviewed that AMA is associated with an increased risk of adverse pregnancy outcomes such as miscarriage, ectopic pregnancy,  diabetes, hypertension, and other adverse outcomes. There is also an increased risk of stillbirth, particularly in women age 40 and above.  Increased Hcg can be associated with adverse pregnancy outcomes.    -[facility] recommends the following for women 35 and older at their JUAN:  -Detailed fetal anatomic survey at 19 to 20 weeks gestation  -Ultrasound for fetal growth at 32 weeks gestation  -In women 40 years and older at JUAN, recommend weekly NST/MVP or weekly BPP beginning at 32 weeks gestation.  -In women 40 years and older at JUAN, recommend delivery between 39 and 40 weeks gestation.  -Recommend offering baby aspirin 81mg PO  daily      3. Marginal PCI: We discussed that typically this is unlikely to be of consequence however there is a potential association with fetal growth  restriction. The patient will have a growth ultrasound at approximately 32 weeks regardless given advanced maternal age and the marginal  insertion.    3. prior pregnancy history: The patient was admitted for pyelonephritis postpartum however her urine culture was not positive but she had  bacteremia and the discharge diagnosis was concern of pyelonephritis. I would recommend primary OB check a monthly urine culture on the  patient and also begin suppressive antibiotic therapy for the pregnancy and for 6 weeks postpartum. Primary OB should also discussed route  of delivery with patient given the 4th degree laceration that she had in the prior pregnancy.    4. Recommend primary OB repeat thyroid studies as indicated in his note given the subclinical hyperthyroidism patient had earlier in  pregnancy. These should be repeated approximately a month after the prior studies. Recommend primary OB addressed hemoglobin  electrophoresis as listed in the recommendations in the electrophoresis results.      45 min was spent face-to-face time with greater than half the time spent counseling and coordination of care. An  was used for the  entire consultation. [id number]      Impression  =========    A anaya living IUP is identified. JUAN is assigned based on the biometry from todays study. If other clinical data, such as IVF conception  dating or prior ultrasound assignment of JUAN differs from the JUAN assigned today, please contact the [facility] department so that this report can  be revised to reflect the correct JUAN.  Normal amniotic fluid volume by qualitative assessment.  Limited early fetal anatomy appears normal.  Femur length and humerus length one week behind composite gestational age.  Placenta location with respect to os is suboptimal due to early gestational age  and DERRELL contraction.  Marginal placental cord insertion at the uterine fundus.  Transabdominal cervical length is normal.    Recommendation  ==============    Baby asa daily.  Patient will discuss if desires NIPT with her primary ob. Declines NIPT today. Declines amniocentesis.  Detailed fetal anatomic survey in approximately 4 weeks.  Growth ultrasound at 32 weeks (marginal PCI and AMA and increased hcg on quad screen).  Primary ob to reassess thyroid studies.  Primary ob to assess for hemoglobinopathy if any concerns as per electrophoresis results.  Recommend primary ob check monthly urine cultures and begin antibiotic suppression given history of postpartum pyelonephritis.  Primary ob to discuss delivery route with the patient given prior 4th degree laceration.  See note above.

## 2020-06-01 NOTE — LETTER
June 1, 2020      Nahun Mendiola MD  [address] [facility] [address]  [address]  [place] [address]           MATERNAL AND FETAL MEDICINE  [address], [address]  [facility] [address]  Phone: [phone]  Fax: [phone]          Patient: Anne Arenas   MR Number: [id number]   YOB: 1985   Date of Visit: 6/1/2020       Dear Dr. Nahun Mendiola:    Thank you for referring Anne Arenas to me for evaluation. Attached you will find relevant portions of my assessment and plan of care.    If you have questions, please do not hesitate to call me. I look forward to following Anne Arenas along with you.    Sincerely,    Katelyn Fields MD    Enclosure  CC:  No Recipients    If you would like to receive this communication electronically, please contact [email or web address] or [phone] to request more information on [facility] [employer] access.    For providers and/or their staff who would like to refer a patient to [facility], please contact us through our one-stop-shop provider referral line, [facility], at [phone].    If you feel you have received this communication in error or would no longer like to receive these types of communications, please e-mail [email or web address]

## 2020-06-22 ENCOUNTER — INITIAL PRENATAL (OUTPATIENT)
Dept: OBSTETRICS AND GYNECOLOGY | Facility: CLINIC | Age: 35
End: 2020-06-22
Payer: MEDICAID

## 2020-06-22 VITALS — SYSTOLIC BLOOD PRESSURE: 98 MMHG | BODY MASS INDEX: 22.65 KG/M2 | WEIGHT: 115.94 LBS | DIASTOLIC BLOOD PRESSURE: 60 MMHG

## 2020-06-22 DIAGNOSIS — O09.522 ADVANCED MATERNAL AGE IN MULTIGRAVIDA, SECOND TRIMESTER: ICD-10-CM

## 2020-06-22 DIAGNOSIS — Z3A.19 19 WEEKS GESTATION OF PREGNANCY: Primary | ICD-10-CM

## 2020-06-22 DIAGNOSIS — Z87.59 HISTORY OF ECTOPIC PREGNANCY: ICD-10-CM

## 2020-06-22 DIAGNOSIS — Z60.3 LANGUAGE BARRIER: ICD-10-CM

## 2020-06-22 DIAGNOSIS — Z75.8 LANGUAGE BARRIER: ICD-10-CM

## 2020-06-22 PROCEDURE — 99212 OFFICE O/P EST SF 10 MIN: CPT | Mod: PBBFAC,TH | Performed by: OBSTETRICS & GYNECOLOGY

## 2020-06-22 PROCEDURE — 99213 PR OFFICE/OUTPT VISIT, EST, LEVL III, 20-29 MIN: ICD-10-PCS | Mod: TH,S$PBB,, | Performed by: OBSTETRICS & GYNECOLOGY

## 2020-06-22 PROCEDURE — 99999 PR PBB SHADOW E&M-EST. PATIENT-LVL II: ICD-10-PCS | Mod: PBBFAC,,, | Performed by: OBSTETRICS & GYNECOLOGY

## 2020-06-22 PROCEDURE — 99999 PR PBB SHADOW E&M-EST. PATIENT-LVL II: CPT | Mod: PBBFAC,,, | Performed by: OBSTETRICS & GYNECOLOGY

## 2020-06-22 PROCEDURE — 99213 OFFICE O/P EST LOW 20 MIN: CPT | Mod: TH,S$PBB,, | Performed by: OBSTETRICS & GYNECOLOGY

## 2020-06-22 SDOH — SOCIAL DETERMINANTS OF HEALTH (SDOH): ACCULTURATION DIFFICULTY: Z60.3

## 2020-07-06 ENCOUNTER — PROCEDURE VISIT (OUTPATIENT)
Dept: MATERNAL FETAL MEDICINE | Facility: CLINIC | Age: 35
End: 2020-07-06
Payer: MEDICAID

## 2020-07-06 VITALS — SYSTOLIC BLOOD PRESSURE: 101 MMHG | DIASTOLIC BLOOD PRESSURE: 61 MMHG | WEIGHT: 117.5 LBS | BODY MASS INDEX: 22.95 KG/M2

## 2020-07-06 DIAGNOSIS — Z36.89 ENCOUNTER FOR ULTRASOUND TO CHECK FETAL GROWTH: Primary | ICD-10-CM

## 2020-07-06 DIAGNOSIS — Z36.89 ENCOUNTER FOR FETAL ANATOMIC SURVEY: ICD-10-CM

## 2020-07-06 DIAGNOSIS — O09.522 ELDERLY MULTIGRAVIDA IN SECOND TRIMESTER: ICD-10-CM

## 2020-07-06 PROCEDURE — 76811 PR US, OB FETAL EVAL & EXAM, TRANSABDOM,FIRST GESTATION: ICD-10-PCS | Mod: 26,S$PBB,, | Performed by: OBSTETRICS & GYNECOLOGY

## 2020-07-06 PROCEDURE — 76811 OB US DETAILED SNGL FETUS: CPT | Mod: PBBFAC,PO | Performed by: OBSTETRICS & GYNECOLOGY

## 2020-07-06 PROCEDURE — 76811 OB US DETAILED SNGL FETUS: CPT | Mod: 26,S$PBB,, | Performed by: OBSTETRICS & GYNECOLOGY

## 2020-07-20 ENCOUNTER — ROUTINE PRENATAL (OUTPATIENT)
Dept: OBSTETRICS AND GYNECOLOGY | Facility: CLINIC | Age: 35
End: 2020-07-20
Payer: MEDICAID

## 2020-07-20 VITALS
DIASTOLIC BLOOD PRESSURE: 62 MMHG | SYSTOLIC BLOOD PRESSURE: 110 MMHG | BODY MASS INDEX: 23.12 KG/M2 | WEIGHT: 118.38 LBS

## 2020-07-20 DIAGNOSIS — O09.522 ADVANCED MATERNAL AGE IN MULTIGRAVIDA, SECOND TRIMESTER: ICD-10-CM

## 2020-07-20 DIAGNOSIS — Z3A.23 23 WEEKS GESTATION OF PREGNANCY: Primary | ICD-10-CM

## 2020-07-20 PROCEDURE — 99999 PR PBB SHADOW E&M-EST. PATIENT-LVL II: CPT | Mod: PBBFAC,,, | Performed by: OBSTETRICS & GYNECOLOGY

## 2020-07-20 PROCEDURE — 99212 OFFICE O/P EST SF 10 MIN: CPT | Mod: PBBFAC,TH | Performed by: OBSTETRICS & GYNECOLOGY

## 2020-07-20 PROCEDURE — 99213 PR OFFICE/OUTPT VISIT, EST, LEVL III, 20-29 MIN: ICD-10-PCS | Mod: TH,S$PBB,, | Performed by: OBSTETRICS & GYNECOLOGY

## 2020-07-20 PROCEDURE — 99999 PR PBB SHADOW E&M-EST. PATIENT-LVL II: ICD-10-PCS | Mod: PBBFAC,,, | Performed by: OBSTETRICS & GYNECOLOGY

## 2020-07-20 PROCEDURE — 99213 OFFICE O/P EST LOW 20 MIN: CPT | Mod: TH,S$PBB,, | Performed by: OBSTETRICS & GYNECOLOGY

## 2020-07-20 NOTE — PROGRESS NOTES
23w2d  No new complaint.  Doing well.    Continue with prenatal vitamins  Glucose tolerance test next week  Back in 4 weeks

## 2020-07-27 ENCOUNTER — TELEPHONE (OUTPATIENT)
Dept: OBSTETRICS AND GYNECOLOGY | Facility: CLINIC | Age: 35
End: 2020-07-27

## 2020-07-27 ENCOUNTER — LAB VISIT (OUTPATIENT)
Dept: LAB | Facility: HOSPITAL | Age: 35
End: 2020-07-27
Attending: OBSTETRICS & GYNECOLOGY
Payer: MEDICAID

## 2020-07-27 DIAGNOSIS — Z3A.23 23 WEEKS GESTATION OF PREGNANCY: ICD-10-CM

## 2020-07-27 DIAGNOSIS — O99.810 GLUCOSE INTOLERANCE OF PREGNANCY: Primary | ICD-10-CM

## 2020-07-27 DIAGNOSIS — Z3A.19 19 WEEKS GESTATION OF PREGNANCY: ICD-10-CM

## 2020-07-27 LAB
BASOPHILS # BLD AUTO: 0.02 K/UL (ref 0–0.2)
BASOPHILS NFR BLD: 0.2 % (ref 0–1.9)
DIFFERENTIAL METHOD: ABNORMAL
EOSINOPHIL # BLD AUTO: 0.2 K/UL (ref 0–0.5)
EOSINOPHIL NFR BLD: 2.5 % (ref 0–8)
ERYTHROCYTE [DISTWIDTH] IN BLOOD BY AUTOMATED COUNT: 13.3 % (ref 11.5–14.5)
GLUCOSE SERPL-MCNC: 201 MG/DL (ref 70–140)
HCT VFR BLD AUTO: 38.4 % (ref 37–48.5)
HGB BLD-MCNC: 12.3 G/DL (ref 12–16)
IMM GRANULOCYTES # BLD AUTO: 0.09 K/UL (ref 0–0.04)
IMM GRANULOCYTES NFR BLD AUTO: 1 % (ref 0–0.5)
LYMPHOCYTES # BLD AUTO: 1.2 K/UL (ref 1–4.8)
LYMPHOCYTES NFR BLD: 13.7 % (ref 18–48)
MCH RBC QN AUTO: 29.1 PG (ref 27–31)
MCHC RBC AUTO-ENTMCNC: 32 G/DL (ref 32–36)
MCV RBC AUTO: 91 FL (ref 82–98)
MONOCYTES # BLD AUTO: 0.4 K/UL (ref 0.3–1)
MONOCYTES NFR BLD: 4.7 % (ref 4–15)
NEUTROPHILS # BLD AUTO: 6.8 K/UL (ref 1.8–7.7)
NEUTROPHILS NFR BLD: 77.9 % (ref 38–73)
NRBC BLD-RTO: 0 /100 WBC
PLATELET # BLD AUTO: 178 K/UL (ref 150–350)
PMV BLD AUTO: 9.7 FL (ref 9.2–12.9)
RBC # BLD AUTO: 4.22 M/UL (ref 4–5.4)
TSH SERPL DL<=0.005 MIU/L-ACNC: 0.45 UIU/ML (ref 0.4–4)
WBC # BLD AUTO: 8.69 K/UL (ref 3.9–12.7)

## 2020-07-27 PROCEDURE — 84443 ASSAY THYROID STIM HORMONE: CPT

## 2020-07-27 PROCEDURE — 85025 COMPLETE CBC W/AUTO DIFF WBC: CPT

## 2020-07-27 PROCEDURE — 82950 GLUCOSE TEST: CPT

## 2020-07-27 PROCEDURE — 36415 COLL VENOUS BLD VENIPUNCTURE: CPT

## 2020-07-28 NOTE — TELEPHONE ENCOUNTER
Pt is aware that she will need to complete 3hr GTT. [facility] number has been provided for scheduling

## 2020-08-03 ENCOUNTER — LAB VISIT (OUTPATIENT)
Dept: LAB | Facility: HOSPITAL | Age: 35
End: 2020-08-03
Attending: OBSTETRICS & GYNECOLOGY
Payer: MEDICAID

## 2020-08-03 DIAGNOSIS — O99.810 GLUCOSE INTOLERANCE OF PREGNANCY: ICD-10-CM

## 2020-08-03 LAB
GLUCOSE SERPL-MCNC: 142 MG/DL
GLUCOSE SERPL-MCNC: 158 MG/DL
GLUCOSE SERPL-MCNC: 50 MG/DL
GLUCOSE SERPL-MCNC: 81 MG/DL (ref 70–110)

## 2020-08-03 PROCEDURE — 82951 GLUCOSE TOLERANCE TEST (GTT): CPT

## 2020-08-03 PROCEDURE — 36415 COLL VENOUS BLD VENIPUNCTURE: CPT

## 2020-08-03 PROCEDURE — 82952 GTT-ADDED SAMPLES: CPT

## 2020-08-17 ENCOUNTER — ROUTINE PRENATAL (OUTPATIENT)
Dept: OBSTETRICS AND GYNECOLOGY | Facility: CLINIC | Age: 35
End: 2020-08-17
Payer: MEDICAID

## 2020-08-17 ENCOUNTER — CLINICAL SUPPORT (OUTPATIENT)
Dept: OBSTETRICS AND GYNECOLOGY | Facility: CLINIC | Age: 35
End: 2020-08-17
Payer: MEDICAID

## 2020-08-17 VITALS — BODY MASS INDEX: 24.07 KG/M2 | WEIGHT: 123.25 LBS | SYSTOLIC BLOOD PRESSURE: 98 MMHG | DIASTOLIC BLOOD PRESSURE: 62 MMHG

## 2020-08-17 VITALS — BODY MASS INDEX: 24.2 KG/M2 | HEIGHT: 60 IN | WEIGHT: 123.25 LBS

## 2020-08-17 DIAGNOSIS — Z23 NEED FOR TDAP VACCINATION: Primary | ICD-10-CM

## 2020-08-17 DIAGNOSIS — O99.810 GLUCOSE INTOLERANCE OF PREGNANCY: ICD-10-CM

## 2020-08-17 DIAGNOSIS — Z3A.27 27 WEEKS GESTATION OF PREGNANCY: Primary | ICD-10-CM

## 2020-08-17 DIAGNOSIS — Z23 NEED FOR TDAP VACCINATION: ICD-10-CM

## 2020-08-17 DIAGNOSIS — O09.522 ADVANCED MATERNAL AGE IN MULTIGRAVIDA, SECOND TRIMESTER: ICD-10-CM

## 2020-08-17 PROCEDURE — 99999 PR PBB SHADOW E&M-EST. PATIENT-LVL II: ICD-10-PCS | Mod: PBBFAC,,, | Performed by: OBSTETRICS & GYNECOLOGY

## 2020-08-17 PROCEDURE — 99212 OFFICE O/P EST SF 10 MIN: CPT | Mod: PBBFAC,TH,25 | Performed by: OBSTETRICS & GYNECOLOGY

## 2020-08-17 PROCEDURE — 99213 PR OFFICE/OUTPT VISIT, EST, LEVL III, 20-29 MIN: ICD-10-PCS | Mod: TH,S$PBB,, | Performed by: OBSTETRICS & GYNECOLOGY

## 2020-08-17 PROCEDURE — 99999 PR PBB SHADOW E&M-EST. PATIENT-LVL II: CPT | Mod: PBBFAC,,, | Performed by: OBSTETRICS & GYNECOLOGY

## 2020-08-17 PROCEDURE — 99213 OFFICE O/P EST LOW 20 MIN: CPT | Mod: TH,S$PBB,, | Performed by: OBSTETRICS & GYNECOLOGY

## 2020-08-17 PROCEDURE — 99999 PR PBB SHADOW E&M-EST. PATIENT-LVL II: CPT | Mod: PBBFAC,,,

## 2020-08-17 PROCEDURE — 99999 PR PBB SHADOW E&M-EST. PATIENT-LVL II: ICD-10-PCS | Mod: PBBFAC,,,

## 2020-08-17 PROCEDURE — 90471 IMMUNIZATION ADMIN: CPT | Mod: PBBFAC

## 2020-08-17 NOTE — PROGRESS NOTES
27w2d  No new complaint.  Eating well.  Discussed her glucose tolerance test.  Apparently she ate some sweets prior to her one-hour glucose testing    Discussed MFM and management of  AMA  Continue with prenatal vitamins and baby aspirin  Needs ultrasound at 32 weeks    Back in 2 weeks.

## 2020-08-31 ENCOUNTER — ROUTINE PRENATAL (OUTPATIENT)
Dept: OBSTETRICS AND GYNECOLOGY | Facility: CLINIC | Age: 35
End: 2020-08-31
Payer: MEDICAID

## 2020-08-31 VITALS — BODY MASS INDEX: 24.41 KG/M2 | WEIGHT: 125 LBS | SYSTOLIC BLOOD PRESSURE: 100 MMHG | DIASTOLIC BLOOD PRESSURE: 62 MMHG

## 2020-08-31 DIAGNOSIS — O09.522 ADVANCED MATERNAL AGE IN MULTIGRAVIDA, SECOND TRIMESTER: ICD-10-CM

## 2020-08-31 DIAGNOSIS — O99.810 GLUCOSE INTOLERANCE OF PREGNANCY: ICD-10-CM

## 2020-08-31 DIAGNOSIS — Z3A.29 29 WEEKS GESTATION OF PREGNANCY: Primary | ICD-10-CM

## 2020-08-31 PROCEDURE — 99213 OFFICE O/P EST LOW 20 MIN: CPT | Mod: PBBFAC,TH | Performed by: OBSTETRICS & GYNECOLOGY

## 2020-08-31 PROCEDURE — 99213 PR OFFICE/OUTPT VISIT, EST, LEVL III, 20-29 MIN: ICD-10-PCS | Mod: TH,S$PBB,, | Performed by: OBSTETRICS & GYNECOLOGY

## 2020-08-31 PROCEDURE — 99213 OFFICE O/P EST LOW 20 MIN: CPT | Mod: TH,S$PBB,, | Performed by: OBSTETRICS & GYNECOLOGY

## 2020-08-31 PROCEDURE — 99999 PR PBB SHADOW E&M-EST. PATIENT-LVL III: CPT | Mod: PBBFAC,,, | Performed by: OBSTETRICS & GYNECOLOGY

## 2020-08-31 PROCEDURE — 99999 PR PBB SHADOW E&M-EST. PATIENT-LVL III: ICD-10-PCS | Mod: PBBFAC,,, | Performed by: OBSTETRICS & GYNECOLOGY

## 2020-08-31 NOTE — PROGRESS NOTES
29w2d  No new complaint  Did well with Tdap.  Just some soreness  Discussed glucose intolerance in pregnancy  Discussed weight gain    Continue with prenatal vitamins  Watch glucose/carb intake    Back in 2 weeks

## 2020-09-21 ENCOUNTER — PROCEDURE VISIT (OUTPATIENT)
Dept: MATERNAL FETAL MEDICINE | Facility: CLINIC | Age: 35
End: 2020-09-21
Payer: MEDICAID

## 2020-09-21 ENCOUNTER — ROUTINE PRENATAL (OUTPATIENT)
Dept: OBSTETRICS AND GYNECOLOGY | Facility: CLINIC | Age: 35
End: 2020-09-21
Payer: MEDICAID

## 2020-09-21 VITALS
DIASTOLIC BLOOD PRESSURE: 62 MMHG | BODY MASS INDEX: 24.37 KG/M2 | WEIGHT: 124.75 LBS | SYSTOLIC BLOOD PRESSURE: 112 MMHG

## 2020-09-21 VITALS — WEIGHT: 123.44 LBS | SYSTOLIC BLOOD PRESSURE: 86 MMHG | DIASTOLIC BLOOD PRESSURE: 61 MMHG | BODY MASS INDEX: 24.11 KG/M2

## 2020-09-21 DIAGNOSIS — Z36.89 ENCOUNTER FOR ULTRASOUND TO CHECK FETAL GROWTH: ICD-10-CM

## 2020-09-21 DIAGNOSIS — O99.810 GLUCOSE INTOLERANCE OF PREGNANCY: ICD-10-CM

## 2020-09-21 DIAGNOSIS — O09.523 ADVANCED MATERNAL AGE IN MULTIGRAVIDA, THIRD TRIMESTER: ICD-10-CM

## 2020-09-21 DIAGNOSIS — Z3A.32 32 WEEKS GESTATION OF PREGNANCY: Primary | ICD-10-CM

## 2020-09-21 PROCEDURE — 76816 PR  US,PREGNANT UTERUS,F/U,TRANSABD APP: ICD-10-PCS | Mod: 26,S$PBB,, | Performed by: OBSTETRICS & GYNECOLOGY

## 2020-09-21 PROCEDURE — 99999 PR PBB SHADOW E&M-EST. PATIENT-LVL II: ICD-10-PCS | Mod: PBBFAC,,, | Performed by: OBSTETRICS & GYNECOLOGY

## 2020-09-21 PROCEDURE — 76816 OB US FOLLOW-UP PER FETUS: CPT | Mod: 26,S$PBB,, | Performed by: OBSTETRICS & GYNECOLOGY

## 2020-09-21 PROCEDURE — 99213 OFFICE O/P EST LOW 20 MIN: CPT | Mod: TH,S$PBB,, | Performed by: OBSTETRICS & GYNECOLOGY

## 2020-09-21 PROCEDURE — 99212 OFFICE O/P EST SF 10 MIN: CPT | Mod: PBBFAC,TH,25 | Performed by: OBSTETRICS & GYNECOLOGY

## 2020-09-21 PROCEDURE — 99213 PR OFFICE/OUTPT VISIT, EST, LEVL III, 20-29 MIN: ICD-10-PCS | Mod: TH,S$PBB,, | Performed by: OBSTETRICS & GYNECOLOGY

## 2020-09-21 PROCEDURE — 76816 OB US FOLLOW-UP PER FETUS: CPT | Mod: PBBFAC,PO | Performed by: OBSTETRICS & GYNECOLOGY

## 2020-09-21 PROCEDURE — 99999 PR PBB SHADOW E&M-EST. PATIENT-LVL II: CPT | Mod: PBBFAC,,, | Performed by: OBSTETRICS & GYNECOLOGY

## 2020-09-21 NOTE — PROGRESS NOTES
32w2d  No new complaint  Went to [facility] ultrasound today.  All normal per patient's report.    Discussed diet and weight gain  Discussed exercise    Back in 2 weeks.

## 2020-09-21 NOTE — PROGRESS NOTES
Obstetrical ultrasound performed today. Please see imaging tab in EPIC for full ultrasound report and recommendations.

## 2020-10-05 ENCOUNTER — ROUTINE PRENATAL (OUTPATIENT)
Dept: OBSTETRICS AND GYNECOLOGY | Facility: CLINIC | Age: 35
End: 2020-10-05
Payer: MEDICAID

## 2020-10-05 ENCOUNTER — PATIENT MESSAGE (OUTPATIENT)
Dept: ADMINISTRATIVE | Facility: HOSPITAL | Age: 35
End: 2020-10-05

## 2020-10-05 VITALS — WEIGHT: 127 LBS | BODY MASS INDEX: 24.8 KG/M2 | SYSTOLIC BLOOD PRESSURE: 98 MMHG | DIASTOLIC BLOOD PRESSURE: 60 MMHG

## 2020-10-05 DIAGNOSIS — Z3A.34 34 WEEKS GESTATION OF PREGNANCY: Primary | ICD-10-CM

## 2020-10-05 DIAGNOSIS — O09.523 ADVANCED MATERNAL AGE IN MULTIGRAVIDA, THIRD TRIMESTER: ICD-10-CM

## 2020-10-05 DIAGNOSIS — O99.810 GLUCOSE INTOLERANCE OF PREGNANCY: ICD-10-CM

## 2020-10-05 PROCEDURE — 99213 OFFICE O/P EST LOW 20 MIN: CPT | Mod: TH,S$PBB,, | Performed by: OBSTETRICS & GYNECOLOGY

## 2020-10-05 PROCEDURE — 99999 PR PBB SHADOW E&M-EST. PATIENT-LVL II: ICD-10-PCS | Mod: PBBFAC,,, | Performed by: OBSTETRICS & GYNECOLOGY

## 2020-10-05 PROCEDURE — 99213 PR OFFICE/OUTPT VISIT, EST, LEVL III, 20-29 MIN: ICD-10-PCS | Mod: TH,S$PBB,, | Performed by: OBSTETRICS & GYNECOLOGY

## 2020-10-05 PROCEDURE — 99999 PR PBB SHADOW E&M-EST. PATIENT-LVL II: CPT | Mod: PBBFAC,,, | Performed by: OBSTETRICS & GYNECOLOGY

## 2020-10-05 PROCEDURE — 99212 OFFICE O/P EST SF 10 MIN: CPT | Mod: PBBFAC,TH | Performed by: OBSTETRICS & GYNECOLOGY

## 2020-10-05 NOTE — PROGRESS NOTES
34w2d  No new complaint  Discussed management plan and MFM's findings  Discussed diet and glucose intolerance  Discussed fetal weight with glucose intolerance.    Back in 2 weeks     No Connected MOM equipment sent to her house

## 2020-10-19 ENCOUNTER — ROUTINE PRENATAL (OUTPATIENT)
Dept: OBSTETRICS AND GYNECOLOGY | Facility: CLINIC | Age: 35
End: 2020-10-19
Payer: MEDICAID

## 2020-10-19 ENCOUNTER — LAB VISIT (OUTPATIENT)
Dept: LAB | Facility: HOSPITAL | Age: 35
End: 2020-10-19
Attending: OBSTETRICS & GYNECOLOGY
Payer: MEDICAID

## 2020-10-19 VITALS
BODY MASS INDEX: 24.76 KG/M2 | WEIGHT: 126.75 LBS | SYSTOLIC BLOOD PRESSURE: 102 MMHG | DIASTOLIC BLOOD PRESSURE: 60 MMHG

## 2020-10-19 DIAGNOSIS — Z3A.36 36 WEEKS GESTATION OF PREGNANCY: Primary | ICD-10-CM

## 2020-10-19 DIAGNOSIS — Z3A.36 36 WEEKS GESTATION OF PREGNANCY: ICD-10-CM

## 2020-10-19 DIAGNOSIS — O99.810 GLUCOSE INTOLERANCE OF PREGNANCY: ICD-10-CM

## 2020-10-19 DIAGNOSIS — O09.523 ADVANCED MATERNAL AGE IN MULTIGRAVIDA, THIRD TRIMESTER: ICD-10-CM

## 2020-10-19 LAB
BASOPHILS # BLD AUTO: 0.03 K/UL (ref 0–0.2)
BASOPHILS NFR BLD: 0.4 % (ref 0–1.9)
DIFFERENTIAL METHOD: ABNORMAL
EOSINOPHIL # BLD AUTO: 0.2 K/UL (ref 0–0.5)
EOSINOPHIL NFR BLD: 2.2 % (ref 0–8)
ERYTHROCYTE [DISTWIDTH] IN BLOOD BY AUTOMATED COUNT: 13.8 % (ref 11.5–14.5)
HCT VFR BLD AUTO: 37.5 % (ref 37–48.5)
HGB BLD-MCNC: 12 G/DL (ref 12–16)
IMM GRANULOCYTES # BLD AUTO: 0.09 K/UL (ref 0–0.04)
IMM GRANULOCYTES NFR BLD AUTO: 1.1 % (ref 0–0.5)
LYMPHOCYTES # BLD AUTO: 1.2 K/UL (ref 1–4.8)
LYMPHOCYTES NFR BLD: 15.8 % (ref 18–48)
MCH RBC QN AUTO: 29 PG (ref 27–31)
MCHC RBC AUTO-ENTMCNC: 32 G/DL (ref 32–36)
MCV RBC AUTO: 91 FL (ref 82–98)
MONOCYTES # BLD AUTO: 0.6 K/UL (ref 0.3–1)
MONOCYTES NFR BLD: 7.3 % (ref 4–15)
NEUTROPHILS # BLD AUTO: 5.7 K/UL (ref 1.8–7.7)
NEUTROPHILS NFR BLD: 73.2 % (ref 38–73)
NRBC BLD-RTO: 0 /100 WBC
PLATELET # BLD AUTO: 149 K/UL (ref 150–350)
PMV BLD AUTO: 9.9 FL (ref 9.2–12.9)
RBC # BLD AUTO: 4.14 M/UL (ref 4–5.4)
WBC # BLD AUTO: 7.84 K/UL (ref 3.9–12.7)

## 2020-10-19 PROCEDURE — 99213 PR OFFICE/OUTPT VISIT, EST, LEVL III, 20-29 MIN: ICD-10-PCS | Mod: TH,S$PBB,, | Performed by: OBSTETRICS & GYNECOLOGY

## 2020-10-19 PROCEDURE — 36415 COLL VENOUS BLD VENIPUNCTURE: CPT

## 2020-10-19 PROCEDURE — 99999 PR PBB SHADOW E&M-EST. PATIENT-LVL II: ICD-10-PCS | Mod: PBBFAC,,, | Performed by: OBSTETRICS & GYNECOLOGY

## 2020-10-19 PROCEDURE — 86703 HIV-1/HIV-2 1 RESULT ANTBDY: CPT

## 2020-10-19 PROCEDURE — 99212 OFFICE O/P EST SF 10 MIN: CPT | Mod: PBBFAC,TH | Performed by: OBSTETRICS & GYNECOLOGY

## 2020-10-19 PROCEDURE — 85025 COMPLETE CBC W/AUTO DIFF WBC: CPT

## 2020-10-19 PROCEDURE — 87081 CULTURE SCREEN ONLY: CPT

## 2020-10-19 PROCEDURE — 99999 PR PBB SHADOW E&M-EST. PATIENT-LVL II: CPT | Mod: PBBFAC,,, | Performed by: OBSTETRICS & GYNECOLOGY

## 2020-10-19 PROCEDURE — 99213 OFFICE O/P EST LOW 20 MIN: CPT | Mod: TH,S$PBB,, | Performed by: OBSTETRICS & GYNECOLOGY

## 2020-10-19 RX ORDER — PRENATAL SUPPLEMENT WITH DHA 1100; 30; 1.6; 1.8; 15; 2.5; .012; 1; .15; 20; 25; 2; 1000; 200; 20; 29 [IU]/1; MG/1; MG/1; MG/1; MG/1; MG/1; MG/1; MG/1; MG/1; MG/1; MG/1; MG/1; [IU]/1; MG/1; [IU]/1; MG/1
1 CAPSULE, GELATIN COATED ORAL DAILY
Qty: 30 CAPSULE | Refills: 6 | Status: SHIPPED | OUTPATIENT
Start: 2020-10-19 | End: 2021-11-29

## 2020-10-19 RX ORDER — FERROUS GLUCONATE 324(38)MG
324 TABLET ORAL
COMMUNITY
Start: 2020-10-19 | End: 2021-11-29

## 2020-10-19 NOTE — PROGRESS NOTES
36w2d  No new complaint  Occasional contractions  No vaginal bleeding or rupture of membranes  No discharge  Good fetal movements    Exam with cervix at 1.5 cm    GBS, HIV, and consents    Labor precautions  Fetal kick count instructed and encouraged  Back next week

## 2020-10-20 LAB — HIV 1+2 AB+HIV1 P24 AG SERPL QL IA: NEGATIVE

## 2020-10-21 LAB — BACTERIA SPEC AEROBE CULT: NORMAL

## 2020-10-26 ENCOUNTER — ROUTINE PRENATAL (OUTPATIENT)
Dept: OBSTETRICS AND GYNECOLOGY | Facility: CLINIC | Age: 35
End: 2020-10-26
Payer: MEDICAID

## 2020-10-26 VITALS
DIASTOLIC BLOOD PRESSURE: 62 MMHG | BODY MASS INDEX: 24.89 KG/M2 | WEIGHT: 127.44 LBS | SYSTOLIC BLOOD PRESSURE: 108 MMHG

## 2020-10-26 DIAGNOSIS — Z3A.37 37 WEEKS GESTATION OF PREGNANCY: Primary | ICD-10-CM

## 2020-10-26 DIAGNOSIS — O99.810 GLUCOSE INTOLERANCE OF PREGNANCY: ICD-10-CM

## 2020-10-26 DIAGNOSIS — O09.523 ADVANCED MATERNAL AGE IN MULTIGRAVIDA, THIRD TRIMESTER: ICD-10-CM

## 2020-10-26 PROCEDURE — 99999 PR PBB SHADOW E&M-EST. PATIENT-LVL II: CPT | Mod: PBBFAC,,, | Performed by: OBSTETRICS & GYNECOLOGY

## 2020-10-26 PROCEDURE — 99213 OFFICE O/P EST LOW 20 MIN: CPT | Mod: TH,S$PBB,, | Performed by: OBSTETRICS & GYNECOLOGY

## 2020-10-26 PROCEDURE — 99999 PR PBB SHADOW E&M-EST. PATIENT-LVL II: ICD-10-PCS | Mod: PBBFAC,,, | Performed by: OBSTETRICS & GYNECOLOGY

## 2020-10-26 PROCEDURE — 99212 OFFICE O/P EST SF 10 MIN: CPT | Mod: PBBFAC,TH | Performed by: OBSTETRICS & GYNECOLOGY

## 2020-10-26 PROCEDURE — 99213 PR OFFICE/OUTPT VISIT, EST, LEVL III, 20-29 MIN: ICD-10-PCS | Mod: TH,S$PBB,, | Performed by: OBSTETRICS & GYNECOLOGY

## 2020-10-26 NOTE — PROGRESS NOTES
No new complaint  Occasional contractions  No vaginal bleeding or rupture of membranes  No discharge  Good fetal movements    Exam with cervix at 1.5 cm    Labor precautions  Fetal kick count instructed and encouraged  Back next week

## 2020-11-02 ENCOUNTER — ROUTINE PRENATAL (OUTPATIENT)
Dept: OBSTETRICS AND GYNECOLOGY | Facility: CLINIC | Age: 35
End: 2020-11-02
Payer: MEDICAID

## 2020-11-02 VITALS
SYSTOLIC BLOOD PRESSURE: 102 MMHG | BODY MASS INDEX: 24.97 KG/M2 | DIASTOLIC BLOOD PRESSURE: 60 MMHG | WEIGHT: 127.88 LBS

## 2020-11-02 DIAGNOSIS — O09.523 ADVANCED MATERNAL AGE IN MULTIGRAVIDA, THIRD TRIMESTER: ICD-10-CM

## 2020-11-02 DIAGNOSIS — Z3A.38 38 WEEKS GESTATION OF PREGNANCY: Primary | ICD-10-CM

## 2020-11-02 PROCEDURE — 99999 PR PBB SHADOW E&M-EST. PATIENT-LVL II: CPT | Mod: PBBFAC,,, | Performed by: OBSTETRICS & GYNECOLOGY

## 2020-11-02 PROCEDURE — 99213 PR OFFICE/OUTPT VISIT, EST, LEVL III, 20-29 MIN: ICD-10-PCS | Mod: TH,S$PBB,, | Performed by: OBSTETRICS & GYNECOLOGY

## 2020-11-02 PROCEDURE — 99212 OFFICE O/P EST SF 10 MIN: CPT | Mod: PBBFAC,TH | Performed by: OBSTETRICS & GYNECOLOGY

## 2020-11-02 PROCEDURE — 99999 PR PBB SHADOW E&M-EST. PATIENT-LVL II: ICD-10-PCS | Mod: PBBFAC,,, | Performed by: OBSTETRICS & GYNECOLOGY

## 2020-11-02 PROCEDURE — 99213 OFFICE O/P EST LOW 20 MIN: CPT | Mod: TH,S$PBB,, | Performed by: OBSTETRICS & GYNECOLOGY

## 2020-11-02 RX ORDER — SIMETHICONE 80 MG
1 TABLET,CHEWABLE ORAL 4 TIMES DAILY PRN
Status: CANCELLED | OUTPATIENT
Start: 2020-11-02

## 2020-11-02 RX ORDER — ONDANSETRON 4 MG/1
8 TABLET, ORALLY DISINTEGRATING ORAL EVERY 8 HOURS PRN
Status: CANCELLED | OUTPATIENT
Start: 2020-11-02

## 2020-11-02 RX ORDER — OXYTOCIN/RINGER'S LACTATE 30/500 ML
95 PLASTIC BAG, INJECTION (ML) INTRAVENOUS ONCE
Status: CANCELLED | OUTPATIENT
Start: 2020-11-02 | End: 2020-11-02

## 2020-11-02 RX ORDER — BUTORPHANOL TARTRATE 1 MG/ML
2 INJECTION INTRAMUSCULAR; INTRAVENOUS
Status: CANCELLED | OUTPATIENT
Start: 2020-11-02

## 2020-11-02 RX ORDER — SODIUM CHLORIDE, SODIUM LACTATE, POTASSIUM CHLORIDE, CALCIUM CHLORIDE 600; 310; 30; 20 MG/100ML; MG/100ML; MG/100ML; MG/100ML
INJECTION, SOLUTION INTRAVENOUS CONTINUOUS
Status: CANCELLED | OUTPATIENT
Start: 2020-11-02

## 2020-11-02 RX ORDER — CALCIUM CARBONATE 200(500)MG
500 TABLET,CHEWABLE ORAL 3 TIMES DAILY PRN
Status: CANCELLED | OUTPATIENT
Start: 2020-11-02

## 2020-11-02 RX ORDER — MISOPROSTOL 100 UG/1
800 TABLET ORAL
Status: CANCELLED | OUTPATIENT
Start: 2020-11-02

## 2020-11-02 RX ORDER — OXYTOCIN/RINGER'S LACTATE 30/500 ML
334 PLASTIC BAG, INJECTION (ML) INTRAVENOUS ONCE
Status: CANCELLED | OUTPATIENT
Start: 2020-11-02 | End: 2020-11-02

## 2020-11-02 RX ORDER — OXYTOCIN/RINGER'S LACTATE 30/500 ML
2 PLASTIC BAG, INJECTION (ML) INTRAVENOUS CONTINUOUS
Status: CANCELLED | OUTPATIENT
Start: 2020-11-02

## 2020-11-02 NOTE — PROGRESS NOTES
38w2d  No new complaint  Occasional contractions  No vaginal bleeding or rupture of membranes  No discharge  Good fetal movements    Exam with cervix at 2.5 cm    Labor precautions  Fetal kick count instructed and encouraged  Back next week for labor induction    Patient requests induction of labor. Risks and benefits of induction versus awaiting spontaneous labor discussed.  Procedure of Pitocin induction explained and discussed.  Questions answered.  Consents signed.  Orders written.  Patient will go over to the hospital for preop now.  She will be admitted for induction on Wednesday, 11/11/2020 at 0200.

## 2020-11-09 ENCOUNTER — ROUTINE PRENATAL (OUTPATIENT)
Dept: OBSTETRICS AND GYNECOLOGY | Facility: CLINIC | Age: 35
End: 2020-11-09
Payer: MEDICAID

## 2020-11-09 VITALS
WEIGHT: 127.88 LBS | DIASTOLIC BLOOD PRESSURE: 60 MMHG | BODY MASS INDEX: 24.97 KG/M2 | SYSTOLIC BLOOD PRESSURE: 110 MMHG

## 2020-11-09 DIAGNOSIS — O99.810 GLUCOSE INTOLERANCE OF PREGNANCY: ICD-10-CM

## 2020-11-09 DIAGNOSIS — O09.523 ADVANCED MATERNAL AGE IN MULTIGRAVIDA, THIRD TRIMESTER: ICD-10-CM

## 2020-11-09 DIAGNOSIS — Z3A.39 39 WEEKS GESTATION OF PREGNANCY: Primary | ICD-10-CM

## 2020-11-09 PROCEDURE — 99999 PR PBB SHADOW E&M-EST. PATIENT-LVL II: CPT | Mod: PBBFAC,,, | Performed by: OBSTETRICS & GYNECOLOGY

## 2020-11-09 PROCEDURE — 99213 PR OFFICE/OUTPT VISIT, EST, LEVL III, 20-29 MIN: ICD-10-PCS | Mod: TH,S$PBB,, | Performed by: OBSTETRICS & GYNECOLOGY

## 2020-11-09 PROCEDURE — 99213 OFFICE O/P EST LOW 20 MIN: CPT | Mod: TH,S$PBB,, | Performed by: OBSTETRICS & GYNECOLOGY

## 2020-11-09 PROCEDURE — 99999 PR PBB SHADOW E&M-EST. PATIENT-LVL II: ICD-10-PCS | Mod: PBBFAC,,, | Performed by: OBSTETRICS & GYNECOLOGY

## 2020-11-09 PROCEDURE — 99212 OFFICE O/P EST SF 10 MIN: CPT | Mod: PBBFAC,TH | Performed by: OBSTETRICS & GYNECOLOGY

## 2020-11-09 NOTE — PROGRESS NOTES
39w2d  No new complaint  Occasional contractions  No vaginal bleeding or rupture of membranes  No discharge  Good fetal movements    Exam with cervix at 3 cm    Labor precautions  Fetal kick count instructed and encouraged  Labor induction scheduled for Wednesday 11/11/2020

## 2020-11-11 ENCOUNTER — HOSPITAL ENCOUNTER (INPATIENT)
Facility: HOSPITAL | Age: 35
LOS: 2 days | Discharge: HOME | End: 2020-11-13
Attending: OBSTETRICS & GYNECOLOGY | Admitting: OBSTETRICS & GYNECOLOGY
Payer: MEDICAID

## 2020-11-11 ENCOUNTER — ANESTHESIA EVENT (OUTPATIENT)
Dept: OBSTETRICS AND GYNECOLOGY | Facility: HOSPITAL | Age: 35
End: 2020-11-11
Payer: MEDICAID

## 2020-11-11 ENCOUNTER — ANESTHESIA (OUTPATIENT)
Dept: OBSTETRICS AND GYNECOLOGY | Facility: HOSPITAL | Age: 35
End: 2020-11-11
Payer: MEDICAID

## 2020-11-11 DIAGNOSIS — O09.523 ADVANCED MATERNAL AGE IN MULTIGRAVIDA, THIRD TRIMESTER: ICD-10-CM

## 2020-11-11 DIAGNOSIS — Z3A.39 39 WEEKS GESTATION OF PREGNANCY: ICD-10-CM

## 2020-11-11 DIAGNOSIS — Z87.59 HISTORY OF ECTOPIC PREGNANCY: ICD-10-CM

## 2020-11-11 DIAGNOSIS — O99.810 ABNORMAL MATERNAL GLUCOSE TOLERANCE, ANTEPARTUM: ICD-10-CM

## 2020-11-11 DIAGNOSIS — Z3A.38 38 WEEKS GESTATION OF PREGNANCY: ICD-10-CM

## 2020-11-11 DIAGNOSIS — H52.203 MYOPIC ASTIGMATISM, BILATERAL: ICD-10-CM

## 2020-11-11 DIAGNOSIS — Z75.8 LANGUAGE BARRIER: ICD-10-CM

## 2020-11-11 DIAGNOSIS — H52.13 MYOPIC ASTIGMATISM, BILATERAL: ICD-10-CM

## 2020-11-11 DIAGNOSIS — Z60.3 LANGUAGE BARRIER: ICD-10-CM

## 2020-11-11 LAB
ABO + RH BLD: NORMAL
BASOPHILS # BLD AUTO: 0.04 K/UL (ref 0–0.2)
BASOPHILS NFR BLD: 0.4 % (ref 0–1.9)
BLD GP AB SCN CELLS X3 SERPL QL: NORMAL
DIFFERENTIAL METHOD: ABNORMAL
EOSINOPHIL # BLD AUTO: 0.2 K/UL (ref 0–0.5)
EOSINOPHIL NFR BLD: 2.3 % (ref 0–8)
ERYTHROCYTE [DISTWIDTH] IN BLOOD BY AUTOMATED COUNT: 13.7 % (ref 11.5–14.5)
HCT VFR BLD AUTO: 38 % (ref 37–48.5)
HGB BLD-MCNC: 12.6 G/DL (ref 12–16)
IMM GRANULOCYTES # BLD AUTO: 0.14 K/UL (ref 0–0.04)
IMM GRANULOCYTES NFR BLD AUTO: 1.5 % (ref 0–0.5)
LYMPHOCYTES # BLD AUTO: 1.7 K/UL (ref 1–4.8)
LYMPHOCYTES NFR BLD: 18.1 % (ref 18–48)
MCH RBC QN AUTO: 29.4 PG (ref 27–31)
MCHC RBC AUTO-ENTMCNC: 33.2 G/DL (ref 32–36)
MCV RBC AUTO: 89 FL (ref 82–98)
MONOCYTES # BLD AUTO: 0.9 K/UL (ref 0.3–1)
MONOCYTES NFR BLD: 9.6 % (ref 4–15)
NEUTROPHILS # BLD AUTO: 6.4 K/UL (ref 1.8–7.7)
NEUTROPHILS NFR BLD: 68.1 % (ref 38–73)
NRBC BLD-RTO: 0 /100 WBC
PLATELET # BLD AUTO: 193 K/UL (ref 150–350)
PMV BLD AUTO: 10 FL (ref 9.2–12.9)
RBC # BLD AUTO: 4.29 M/UL (ref 4–5.4)
SARS-COV-2 RDRP RESP QL NAA+PROBE: NEGATIVE
WBC # BLD AUTO: 9.37 K/UL (ref 3.9–12.7)

## 2020-11-11 PROCEDURE — 72200005 HC VAGINAL DELIVERY LEVEL II

## 2020-11-11 PROCEDURE — 36415 COLL VENOUS BLD VENIPUNCTURE: CPT

## 2020-11-11 PROCEDURE — 86850 RBC ANTIBODY SCREEN: CPT

## 2020-11-11 PROCEDURE — 59409 PRA ETRICAL CARE,VAG DELIV ONLY: ICD-10-PCS | Mod: AA,,, | Performed by: ANESTHESIOLOGY

## 2020-11-11 PROCEDURE — C1751 CATH, INF, PER/CENT/MIDLINE: HCPCS | Performed by: ANESTHESIOLOGY

## 2020-11-11 PROCEDURE — 25000003 PHARM REV CODE 250: Performed by: ANESTHESIOLOGY

## 2020-11-11 PROCEDURE — 25000003 PHARM REV CODE 250: Performed by: OBSTETRICS & GYNECOLOGY

## 2020-11-11 PROCEDURE — 11000001 HC ACUTE MED/SURG PRIVATE ROOM

## 2020-11-11 PROCEDURE — 59409 PR OBSTETRICAL CARE,VAG DELIV ONLY: ICD-10-PCS | Mod: GB,,, | Performed by: OBSTETRICS & GYNECOLOGY

## 2020-11-11 PROCEDURE — 63600175 PHARM REV CODE 636 W HCPCS: Performed by: OBSTETRICS & GYNECOLOGY

## 2020-11-11 PROCEDURE — 62326 NJX INTERLAMINAR LMBR/SAC: CPT | Performed by: ANESTHESIOLOGY

## 2020-11-11 PROCEDURE — 59409 OBSTETRICAL CARE: CPT | Mod: GB,,, | Performed by: OBSTETRICS & GYNECOLOGY

## 2020-11-11 PROCEDURE — U0002 COVID-19 LAB TEST NON-CDC: HCPCS

## 2020-11-11 PROCEDURE — 85025 COMPLETE CBC W/AUTO DIFF WBC: CPT

## 2020-11-11 PROCEDURE — 72100002 HC LABOR CARE, 1ST 8 HOURS

## 2020-11-11 PROCEDURE — 59409 OBSTETRICAL CARE: CPT | Mod: AA,,, | Performed by: ANESTHESIOLOGY

## 2020-11-11 PROCEDURE — 63600175 PHARM REV CODE 636 W HCPCS: Performed by: ANESTHESIOLOGY

## 2020-11-11 PROCEDURE — 27200710 HC EPIDURAL INFUSION PUMP SET: Performed by: ANESTHESIOLOGY

## 2020-11-11 PROCEDURE — 51702 INSERT TEMP BLADDER CATH: CPT

## 2020-11-11 RX ORDER — ONDANSETRON 8 MG/1
8 TABLET, ORALLY DISINTEGRATING ORAL EVERY 8 HOURS PRN
Status: DISCONTINUED | OUTPATIENT
Start: 2020-11-11 | End: 2020-11-13 | Stop reason: HOSPADM

## 2020-11-11 RX ORDER — OXYTOCIN/RINGER'S LACTATE 30/500 ML
334 PLASTIC BAG, INJECTION (ML) INTRAVENOUS ONCE
Status: DISCONTINUED | OUTPATIENT
Start: 2020-11-11 | End: 2020-11-11

## 2020-11-11 RX ORDER — BUTORPHANOL TARTRATE 1 MG/ML
2 INJECTION INTRAMUSCULAR; INTRAVENOUS
Status: DISCONTINUED | OUTPATIENT
Start: 2020-11-11 | End: 2020-11-13 | Stop reason: HOSPADM

## 2020-11-11 RX ORDER — OXYTOCIN/RINGER'S LACTATE 30/500 ML
41.65 PLASTIC BAG, INJECTION (ML) INTRAVENOUS CONTINUOUS
Status: ACTIVE | OUTPATIENT
Start: 2020-11-11 | End: 2020-11-11

## 2020-11-11 RX ORDER — OXYTOCIN/RINGER'S LACTATE 30/500 ML
95 PLASTIC BAG, INJECTION (ML) INTRAVENOUS ONCE
Status: DISCONTINUED | OUTPATIENT
Start: 2020-11-11 | End: 2020-11-11

## 2020-11-11 RX ORDER — SODIUM CHLORIDE, SODIUM LACTATE, POTASSIUM CHLORIDE, CALCIUM CHLORIDE 600; 310; 30; 20 MG/100ML; MG/100ML; MG/100ML; MG/100ML
INJECTION, SOLUTION INTRAVENOUS CONTINUOUS
Status: DISCONTINUED | OUTPATIENT
Start: 2020-11-11 | End: 2020-11-13 | Stop reason: HOSPADM

## 2020-11-11 RX ORDER — IBUPROFEN 600 MG/1
600 TABLET ORAL EVERY 6 HOURS
Status: DISCONTINUED | OUTPATIENT
Start: 2020-11-11 | End: 2020-11-13 | Stop reason: HOSPADM

## 2020-11-11 RX ORDER — DIPHENHYDRAMINE HCL 25 MG
25 CAPSULE ORAL EVERY 4 HOURS PRN
Status: DISCONTINUED | OUTPATIENT
Start: 2020-11-11 | End: 2020-11-13 | Stop reason: HOSPADM

## 2020-11-11 RX ORDER — MISOPROSTOL 200 UG/1
800 TABLET ORAL
Status: DISCONTINUED | OUTPATIENT
Start: 2020-11-11 | End: 2020-11-13 | Stop reason: HOSPADM

## 2020-11-11 RX ORDER — HYDROCORTISONE 25 MG/G
CREAM TOPICAL 3 TIMES DAILY PRN
Status: DISCONTINUED | OUTPATIENT
Start: 2020-11-11 | End: 2020-11-13 | Stop reason: HOSPADM

## 2020-11-11 RX ORDER — CALCIUM CARBONATE 200(500)MG
500 TABLET,CHEWABLE ORAL 3 TIMES DAILY PRN
Status: DISCONTINUED | OUTPATIENT
Start: 2020-11-11 | End: 2020-11-13 | Stop reason: HOSPADM

## 2020-11-11 RX ORDER — ACETAMINOPHEN 325 MG/1
650 TABLET ORAL EVERY 6 HOURS PRN
Status: DISCONTINUED | OUTPATIENT
Start: 2020-11-11 | End: 2020-11-13 | Stop reason: HOSPADM

## 2020-11-11 RX ORDER — OXYTOCIN/RINGER'S LACTATE 30/500 ML
2 PLASTIC BAG, INJECTION (ML) INTRAVENOUS CONTINUOUS
Status: DISCONTINUED | OUTPATIENT
Start: 2020-11-11 | End: 2020-11-11

## 2020-11-11 RX ORDER — MISOPROSTOL 200 UG/1
200 TABLET ORAL ONCE
Status: DISCONTINUED | OUTPATIENT
Start: 2020-11-11 | End: 2020-11-13 | Stop reason: HOSPADM

## 2020-11-11 RX ORDER — OXYCODONE AND ACETAMINOPHEN 5; 325 MG/1; MG/1
1 TABLET ORAL EVERY 4 HOURS PRN
Status: DISCONTINUED | OUTPATIENT
Start: 2020-11-11 | End: 2020-11-13 | Stop reason: HOSPADM

## 2020-11-11 RX ORDER — SIMETHICONE 80 MG
1 TABLET,CHEWABLE ORAL EVERY 6 HOURS PRN
Status: DISCONTINUED | OUTPATIENT
Start: 2020-11-11 | End: 2020-11-13 | Stop reason: HOSPADM

## 2020-11-11 RX ORDER — FENTANYL CITRATE 50 UG/ML
INJECTION, SOLUTION INTRAMUSCULAR; INTRAVENOUS
Status: DISCONTINUED | OUTPATIENT
Start: 2020-11-11 | End: 2020-11-11

## 2020-11-11 RX ORDER — SIMETHICONE 80 MG
1 TABLET,CHEWABLE ORAL 4 TIMES DAILY PRN
Status: DISCONTINUED | OUTPATIENT
Start: 2020-11-11 | End: 2020-11-13 | Stop reason: HOSPADM

## 2020-11-11 RX ORDER — MISOPROSTOL 200 UG/1
TABLET ORAL
Status: DISPENSED
Start: 2020-11-11 | End: 2020-11-12

## 2020-11-11 RX ORDER — AMOXICILLIN 250 MG
1 CAPSULE ORAL NIGHTLY
Status: DISCONTINUED | OUTPATIENT
Start: 2020-11-11 | End: 2020-11-13 | Stop reason: HOSPADM

## 2020-11-11 RX ORDER — BUTORPHANOL TARTRATE 1 MG/ML
2 INJECTION INTRAMUSCULAR; INTRAVENOUS
Status: DISCONTINUED | OUTPATIENT
Start: 2020-11-11 | End: 2020-11-11

## 2020-11-11 RX ORDER — OXYTOCIN/RINGER'S LACTATE 30/500 ML
2 PLASTIC BAG, INJECTION (ML) INTRAVENOUS CONTINUOUS
Status: DISCONTINUED | OUTPATIENT
Start: 2020-11-11 | End: 2020-11-13 | Stop reason: HOSPADM

## 2020-11-11 RX ORDER — IBUPROFEN 400 MG/1
800 TABLET ORAL EVERY 8 HOURS PRN
Status: DISCONTINUED | OUTPATIENT
Start: 2020-11-11 | End: 2020-11-13 | Stop reason: HOSPADM

## 2020-11-11 RX ORDER — MISOPROSTOL 200 UG/1
600 TABLET ORAL ONCE
Status: COMPLETED | OUTPATIENT
Start: 2020-11-11 | End: 2020-11-11

## 2020-11-11 RX ORDER — BUPIVACAINE HYDROCHLORIDE 2.5 MG/ML
INJECTION, SOLUTION EPIDURAL; INFILTRATION; INTRACAUDAL
Status: DISCONTINUED | OUTPATIENT
Start: 2020-11-11 | End: 2020-11-11

## 2020-11-11 RX ORDER — ROPIVACAINE HYDROCHLORIDE 2 MG/ML
INJECTION, SOLUTION EPIDURAL; INFILTRATION; PERINEURAL CONTINUOUS PRN
Status: DISCONTINUED | OUTPATIENT
Start: 2020-11-11 | End: 2020-11-11

## 2020-11-11 RX ADMIN — MISOPROSTOL 600 MCG: 200 TABLET ORAL at 12:11

## 2020-11-11 RX ADMIN — SODIUM CHLORIDE, SODIUM LACTATE, POTASSIUM CHLORIDE, AND CALCIUM CHLORIDE 1000 ML: .6; .31; .03; .02 INJECTION, SOLUTION INTRAVENOUS at 02:11

## 2020-11-11 RX ADMIN — ROPIVACAINE HYDROCHLORIDE 7 ML/HR: 2 INJECTION, SOLUTION EPIDURAL; INFILTRATION at 08:11

## 2020-11-11 RX ADMIN — IBUPROFEN 800 MG: 400 TABLET, FILM COATED ORAL at 03:11

## 2020-11-11 RX ADMIN — Medication 2 MILLI-UNITS/MIN: at 03:11

## 2020-11-11 RX ADMIN — FENTANYL CITRATE 100 MCG: 50 INJECTION, SOLUTION INTRAMUSCULAR; INTRAVENOUS at 08:11

## 2020-11-11 RX ADMIN — IBUPROFEN 600 MG: 600 TABLET, FILM COATED ORAL at 11:11

## 2020-11-11 RX ADMIN — BUPIVACAINE HYDROCHLORIDE 6 ML: 2.5 INJECTION, SOLUTION EPIDURAL; INFILTRATION; INTRACAUDAL; PERINEURAL at 08:11

## 2020-11-11 RX ADMIN — IBUPROFEN 600 MG: 600 TABLET, FILM COATED ORAL at 06:11

## 2020-11-11 SDOH — SOCIAL DETERMINANTS OF HEALTH (SDOH): ACCULTURATION DIFFICULTY: Z60.3

## 2020-11-11 NOTE — NURSING
Pt is  at 39weeks 4days arrived to L&D unit for scheduled induction of labor. Care assumed. Full assessment done, history and medications reviewed with pt. Pt gowned, EFM, TOCO, automatic b/p and pulse ox applied. Pt and family updated on plan of care with questions answered. Bed in low locked position, call bell in reach.

## 2020-11-11 NOTE — PROGRESS NOTES
[facility]  Obstetrics  Antepartum Progress Note    Patient Name: Anne Arenas  MRN: [id number]  Admission Date: 2020  Hospital Length of Stay: 0 days  Attending Physician: Nahun Mendiola MD  Primary Care Provider: Mook Ferreira MD    Subjective:     Principal Problem:39 weeks gestation of pregnancy    HPI:  36 yo  at 39w4d  Advanced maternal age  Glucose intolerance in pregnancy  History of uterine inversion with her last delivery  Admitted today, 2020 for labor induction with favorable cervix  Risks and benefits discussed    Hospital Course:  Pitocin  AROM at 0715 2020.  Clear with cervix at 4/80%/-2/vertex    Obstetric HPI:  Patient reports No contractions, active fetal movement, No vaginal bleeding , No loss of fluid     This pregnancy has been complicated by   1. Advanced maternal age  2. Glucose intolerance in pregnancy  3. History of uterine inversion with her last delivery  4. History of laparoscopic salpingectomy for ectopic pregnancy    OB History    Para Term  AB Living   5 1 1 0 3 1   SAB TAB Ectopic Multiple Live Births   0 2 1 0 1      # Outcome Date GA Lbr Sheldon/2nd Weight Sex Delivery Anes PTL Lv   5 Current            4 Term 03/05/15 40w0d  3.515 kg (7 lb 12 oz) M Vag-Vacuum EPI N LEONARDO      Complications: Uterine inversion associated with pregnancy, Fourth degree perineal laceration, Puerperal sepsis, postpartum      Name: Matt   3 Ectopic 13           2 TAB            1 TAB              Past Medical History:   Diagnosis Date    Hx of ectopic pregnancy 13    Ruptured right ectopic pregnancy     Past Surgical History:   Procedure Laterality Date    DILATION AND CURETTAGE OF UTERUS      TAB    DILATION AND CURETTAGE OF UTERUS      TAB    PELVIC LAPAROSCOPY Right 13    ruptured ectopic pregnancy    SALPINGECTOMY Right 13    Ruptured Ectopic pregnancy       PTA Medications   Medication Sig    ferrous  gluconate (FERGON) 324 MG tablet Take 1 tablet (324 mg total) by mouth daily with breakfast.    prenatal 26-iron ps-folic-dha (VITAFOL-ONE) 29 mg iron- 1 mg-200 mg Cap Take 1 capsule by mouth once daily.       Review of patient's allergies indicates:  No Known Allergies     Family History     Problem Relation (Age of Onset)    Hyperlipidemia Maternal Grandmother    Hypertension Maternal Grandmother    No Known Problems Paternal Grandfather, Paternal Grandmother, Maternal Grandfather, Father, Mother, Sister, Brother, Maternal Aunt, Maternal Uncle, Paternal Aunt, Paternal Uncle        Tobacco Use    Smoking status: Never Smoker    Smokeless tobacco: Never Used   Substance and Sexual Activity    Alcohol use: No    Drug use: No    Sexual activity: Yes     Partners: Male     Birth control/protection: None     Review of Systems   Constitutional: Positive for fatigue. Negative for activity change, appetite change, fever and unexpected weight change.   Respiratory: Negative for cough, shortness of breath and wheezing.    Cardiovascular: Negative for chest pain and palpitations.   Gastrointestinal: Positive for abdominal pain and nausea. Negative for vomiting.   Endocrine: Negative for hot flashes.   Genitourinary: Positive for frequency, pelvic pain and vaginal discharge. Negative for dysmenorrhea, dyspareunia, urgency, vaginal bleeding and postcoital bleeding.   Musculoskeletal: Positive for back pain. Negative for myalgias.   Integumentary:  Negative for rash, breast mass and nipple discharge.   Neurological: Positive for headaches. Negative for seizures.   Psychiatric/Behavioral: Negative for depression and sleep disturbance. The patient is not nervous/anxious.    Breast: Negative for mass, mastodynia and nipple discharge     Objective:     Vital Signs (Most Recent):  Temp: 98.1 °F (36.7 °C) (11/11/20 0247)  Pulse: 65 (11/11/20 0247)  Resp: 18 (11/11/20 0247)  BP: 97/63 (11/11/20 0247)  SpO2: 99 % (11/11/20 0247)  Vital Signs (24h Range):  Temp:  [98.1 °F (36.7 °C)] 98.1 °F (36.7 °C)  Pulse:  [65] 65  Resp:  [18] 18  SpO2:  [99 %] 99 %  BP: (97)/(63) 97/63     Weight: 57.6 kg (127 lb)  Body mass index is 24 kg/m².    FHT: 150 Cat 1 (reassuring)  TOCO:  Q 3 minutes    Physical Exam:   Constitutional: She appears well-developed and well-nourished. No distress.    HENT:   Head: Normocephalic and atraumatic.    Eyes: EOM are normal.    Neck: Normal range of motion.    Cardiovascular: Normal rate.     Pulmonary/Chest: Effort normal. No respiratory distress.        Abdominal: Soft. She exhibits no distension. There is no abdominal tenderness. There is no rebound and no guarding.   Gravid               Musculoskeletal: Normal range of motion.       Neurological: She is alert.    Skin: Skin is warm and dry.    Psychiatric: She has a normal mood and affect.       Cervix:  Dilation:  4  Effacement:  75%  Station: -2  Presentation: Vertex     Significant Labs:  Lab Results   Component Value Date    GROUPTRH B POS 2020    HEPBSAG Negative 2020    STREPBCULT No Group B Streptococcus isolated 10/19/2020       CBC:   Recent Labs   Lab 20  0250   WBC 9.37   RBC 4.29   HGB 12.6   HCT 38.0      MCV 89   MCH 29.4   MCHC 33.2     I have personallly reviewed all pertinent lab results from the last 24 hours.    Assessment/Plan:     35 y.o. female  at 39w4d for:    * 39 weeks gestation of pregnancy  Continue with pitocin  Pain control  Expect  later today, 2020    Advanced maternal age in multigravida, third trimester  Continue with pitocin  Pain control  Expect  later today, 2020          Nahun Mendiola MD  Obstetrics  [facility]

## 2020-11-11 NOTE — HPI
36 yo  at 39w4d  Advanced maternal age  Glucose intolerance in pregnancy  History of uterine inversion with her last delivery  Admitted today, 2020 for labor induction with favorable cervix  Risks and benefits discussed

## 2020-11-11 NOTE — ANESTHESIA PROCEDURE NOTES
Epidural    Patient location during procedure: OB   Reason for block: primary anesthetic   Diagnosis: IUP   Start time: 11/11/2020 8:30 AM  Timeout: 11/11/2020 8:30 AM  End time: 11/11/2020 8:40 AM    Staffing  Performing Provider: Shiva Hill MD  Authorizing Provider: Shiva Hill MD        Preanesthetic Checklist  Completed: patient identified, site marked, pre-op evaluation, timeout performed, IV checked, risks and benefits discussed, monitors and equipment checked, anesthesia consent given, hand hygiene performed and patient being monitored  Preparation  Patient position: sitting  Prep: ChloraPrep  Patient monitoring: ECG, Pulse Ox and Blood Pressure  Epidural  Skin Anesthetic: lidocaine 1%  Skin Wheal: 5 mL  Administration type: continuous  Approach: midline  Interspace: L3-4    Injection technique: ÓSCAR air  Needle and Epidural Catheter  Needle type: Tuohy   Needle gauge: 17  Needle length: 3.5 inches  Needle insertion depth: 5 cm  Catheter type: springwound and multi-orifice  Catheter size: 19 G  Catheter at skin depth: 10 cm  Test dose: 3 mL of lidocaine 1.5% with Epi 1-to-200,000  Additional Documentation: incremental injection, no paresthesia on injection, no significant pain on injection, negative aspiration for heme and CSF, no signs/symptoms of IV or SA injection and no significant complaints from patient  Needle localization: anatomical landmarks  Assessment  Upper dermatomal levels - Left: T6  Right: T6   Dermatomal levels determined by alcohol wipe  Ease of block: easy  Patient's tolerance of the procedure: comfortable throughout block and no complaintsNo inadvertent dural puncture with Tuohy.

## 2020-11-11 NOTE — PLAN OF CARE
Preparation and Hygiene:  Shower daily.  Wear a clean bra each day and wash daily in warm soapy water.  Change wet or moist breast pads frequently.  Moist pads can promote growth of germs.  Actively wash your hands, paying close attention to the area around and under your fingernails, thoroughly with soap and water for 15 seconds before pumping or handling your milk.  Re-wash your hands if you touch anything (scratching your nose, answering the phone, etc) while pumping or handling your milk.   Before pumping your breasts, assemble the pump collection kit and have ready the sterile container and labels.  Place these items on a clean surface next to the breast pump.  Each time after you have finished pumping, take apart all of the parts of the breast pump collection kit and place them in a separate cleaning container (do not place them in the sink).  Be sure to remove the yellow valve from the breast shield and separate the white membrane from the yellow valve.  Rinse all of these parts with cool water.  Then use a new sponge and/or bottle brush and dishwashing detergent to clean the parts.  Rinse off the soapy water with cool water and air dry on a clean towel covered with a clean cloth.  All parts may also be washed after each use in the top rack of a .  Once each day, sanitize all of the parts of the breast pump collection kit.  This can be done by boiling the kit parts for 10 minutes or by using a Quick Clean Micro-Steam Bag made by [employer]  If condensation appears in the tubing, continue to run the pump with the tubing attached for 1-2 minutes or until the tubing is dry.   Notify your babys nurse or doctor if you become ill or need to take any medication, even over-the-counter medicines.  Collection and Storage of Expressed Breast milk:         1. Pump your breasts at least 8 or more times every 24 hours.  Double pump (both breasts at the same time) for at least 15-20 minutes using the most  suction that is comfortable.    2. Write the date and time of pumping and the name of any medications you are taking on the babys pre-printed hospital identification label.   3.    Do not touch the inside of the storage containers or lids.  4.        Tightly screw the lid onto the container and place immediately into the refrigerator for daily use.  5.    Expressed breast milk should be refrigerated or frozen within 4 hours of pumping.  6.        Do not store expressed breast milk on the door of your refrigerator or freezer             where the temperature is warmer.   7.        Refrigerated milk may be stored for up to 7 days.  At this point it can be moved to the freezer for 6 -12 months.  8.        Thaw frozen breast milk in overnight in the refrigerator.  Once milk is thawed it must be used within 24 hours.  9.        Refrigerated breast milk needs to be warmed to room temperature.  Warm by leaving unrefrigerated until it reached room temperature, or place sealed bottle into a cup of warm (not boiling) water or use a bottle warmer.               Never warm breast milk in a microwave or boiling water.    For any questions or concerns call the Lactation Department at

## 2020-11-11 NOTE — PROGRESS NOTES
Instructed on the AAP recommendation of exclusive breastfeeding for the first 6 months of life and continued breastfeeding with the introduction of supplemental foods beyond the first year of life.  Instructed on the recommendation to delay all bottle and pacifier use until after 4 weeks of age and breastfeeding is well established.  Discussed the benefits of exclusive breastfeeding for both mother and baby.  Discussed the risks of supplementation/pacifier use on the exclusivity of breastfeeding in the first 6 months.  Pt states understanding and verbalized appropriate recall.    Patient encouraged to frequently void. Patient will call prior to getting up.

## 2020-11-11 NOTE — HOSPITAL COURSE
Pitocin  AROM at 0715 2020.  Clear with cervix at 4/80%/-2/vertex  Complete at 1030    Viable male infant 1211 2020  Weight: 3.35 kg (7 lb 6.2 oz)  Apgar:   Placenta: spontaneous and intact with three vessels  Cytotec given for postpartum hemorrhage  Perineal laceration. Repaired with 3.0 chromic  EBL: 450 cc  No complication  No specimen    Postpartum course was benign.  She is breast-feeding with supplementation.  Exam was benign with patient afebrile, vitals stable, and minimal bleeding.  Tender over coccyx  Normal activities.  Patient discharged home on postpartum day #2, 2020   Discharge medications include Motrin, prenatal vitamins, and iron supplement.  Follow-up with me in 6 weeks.    Nahun Mendiola MD.

## 2020-11-11 NOTE — ANESTHESIA POSTPROCEDURE EVALUATION
Anesthesia Post Evaluation    Patient: Anne Arenas    Procedure(s) Performed: * No procedures listed *    Final Anesthesia Type: epidural    Patient location during evaluation: PACU  Patient participation: Yes- Able to Participate  Level of consciousness: awake and alert, oriented and awake  Post-procedure vital signs: reviewed and stable  Pain management: adequate  Airway patency: patent    PONV status at discharge: No PONV  Anesthetic complications: no      Cardiovascular status: blood pressure returned to baseline, hemodynamically stable and stable  Respiratory status: unassisted and spontaneous ventilation  Hydration status: euvolemic  Follow-up not needed.          Vitals Value Taken Time   BP 98/63 11/11/20 1417   Temp 36.7 °C (98.1 °F) 11/11/20 0247   Pulse 72 11/11/20 1432   Resp 18 11/11/20 0247   SpO2 99 % 11/11/20 1429   Vitals shown include unvalidated device data.      No case tracking events are documented in the log.      Pain/Rizwan Score: No data recorded

## 2020-11-11 NOTE — ANESTHESIA PREPROCEDURE EVALUATION
11/11/2020  Anne Arenas is a 35 y.o., female.    Anesthesia Evaluation          Review of Systems  Anesthesia Hx:  No previous Anesthesia   Social:  Non-Smoker    Hematology/Oncology:  Hematology Normal   Oncology Normal     EENT/Dental:EENT/Dental Normal   Cardiovascular:  Cardiovascular Normal     Pulmonary:  Pulmonary Normal    Renal/:  Renal/ Normal     Hepatic/GI:  Hepatic/GI Normal    Musculoskeletal:  Musculoskeletal Normal    Neurological:  Neurology Normal    Endocrine:  Endocrine Normal    Dermatological:  Skin Normal    Psych:  Psychiatric Normal           Physical Exam  General:  Well nourished    Airway/Jaw/Neck:  Airway Findings: Mallampati: II TM Distance: < 4 cm      Dental:  DENTAL FINDINGS: Normal   Chest/Lungs:  Chest/Lungs Clear    Heart/Vascular:  Heart Findings: Normal       Mental Status:  Mental Status Findings:  Cooperative, Alert and Oriented         Anesthesia Plan  Type of Anesthesia, risks & benefits discussed:  Anesthesia Type:  general  Patient's Preference:   Intra-op Monitoring Plan: standard ASA monitors  Intra-op Monitoring Plan Comments:   Post Op Pain Control Plan: multimodal analgesia, IV/PO Opioids PRN and per primary service following discharge from PACU  Post Op Pain Control Plan Comments:   Induction:    Beta Blocker:  Patient is not currently on a Beta-Blocker (No further documentation required).       Informed Consent: Patient understands risks and agrees with Anesthesia plan.  Questions answered. Anesthesia consent signed with patient.  ASA Score: 2     Day of Surgery Review of History & Physical:    H&P update referred to the provider.  H&P completed by Anesthesiologist.   Anesthesia Plan Notes: npo        Ready For Surgery From Anesthesia Perspective.

## 2020-11-11 NOTE — LACTATION NOTE
11/11/20 1245   Maternal Assessment   Breast Density Bilateral:;soft   Areola Bilateral:;elastic   Nipples Bilateral:;everted   Maternal Infant Feeding   Maternal Emotional State assist needed   Infant Positioning cradle   Signs of Milk Transfer audible swallow;infant jaw motion present   Pain with Feeding no   Comfort Measures Before/During Feeding infant position adjusted;latch adjusted;maternal position adjusted   Latch Assistance yes     Baby skin to skin with mother after delivery. Assisted mother to latch baby to left breast in cradle position. Baby latched deeply, nursing well with audible swallows. Mother denies pain during feeding. Reviewed basic breastfeeding instructions and encouraged patient to call me for any further breastfeeding assistance. Patient verbalizes understanding of all instructions with good recall.

## 2020-11-11 NOTE — H&P
[facility]  Obstetrics  History & Physical    Patient Name: Anne Arenas  MRN: [id number]  Admission Date: 2020  Primary Care Provider: Mook Ferreira MD    Subjective:     Principal Problem:39 weeks gestation of pregnancy    History of Present Illness:  34 yo  at 39w4d  Advanced maternal age  Glucose intolerance in pregnancy  History of uterine inversion with her last delivery  Admitted today, 2020 for labor induction with favorable cervix  Risks and benefits discussed    No new subjective & objective note has been filed under this hospital service since the last note was generated.    Assessment/Plan:     35 y.o. female  at 39w4d for:    * 39 weeks gestation of pregnancy  Continue with pitocin  Pain control  Expect  later today, 2020    Advanced maternal age in multigravida, third trimester  Continue with pitocin  Pain control  Expect  later today, 2020        Nahun Mendiola MD  Obstetrics  [facility]

## 2020-11-11 NOTE — SUBJECTIVE & OBJECTIVE
Obstetric HPI:  Patient reports No contractions, active fetal movement, No vaginal bleeding , No loss of fluid     This pregnancy has been complicated by   1. Advanced maternal age  2. Glucose intolerance in pregnancy  3. History of uterine inversion with her last delivery  4. History of laparoscopic salpingectomy for ectopic pregnancy    OB History    Para Term  AB Living   5 1 1 0 3 1   SAB TAB Ectopic Multiple Live Births   0 2 1 0 1      # Outcome Date GA Lbr Sheldon/2nd Weight Sex Delivery Anes PTL Lv   5 Current            4 Term 03/05/15 40w0d  3.515 kg (7 lb 12 oz) M Vag-Vacuum EPI N LEONARDO      Complications: Uterine inversion associated with pregnancy, Fourth degree perineal laceration, Puerperal sepsis, postpartum      Name: Matt   3 Ectopic 13           2 TAB            1 TAB              Past Medical History:   Diagnosis Date    Hx of ectopic pregnancy 13    Ruptured right ectopic pregnancy     Past Surgical History:   Procedure Laterality Date    DILATION AND CURETTAGE OF UTERUS      TAB    DILATION AND CURETTAGE OF UTERUS      TAB    PELVIC LAPAROSCOPY Right 13    ruptured ectopic pregnancy    SALPINGECTOMY Right 13    Ruptured Ectopic pregnancy       PTA Medications   Medication Sig    ferrous gluconate (FERGON) 324 MG tablet Take 1 tablet (324 mg total) by mouth daily with breakfast.    prenatal 26-iron ps-folic-dha (VITAFOL-ONE) 29 mg iron- 1 mg-200 mg Cap Take 1 capsule by mouth once daily.       Review of patient's allergies indicates:  No Known Allergies     Family History     Problem Relation (Age of Onset)    Hyperlipidemia Maternal Grandmother    Hypertension Maternal Grandmother    No Known Problems Paternal Grandfather, Paternal Grandmother, Maternal Grandfather, Father, Mother, Sister, Brother, Maternal Aunt, Maternal Uncle, Paternal Aunt, Paternal Uncle        Tobacco Use    Smoking status: Never Smoker    Smokeless tobacco: Never  Used   Substance and Sexual Activity    Alcohol use: No    Drug use: No    Sexual activity: Yes     Partners: Male     Birth control/protection: None     Review of Systems   Constitutional: Positive for fatigue. Negative for activity change, appetite change, fever and unexpected weight change.   Respiratory: Negative for cough, shortness of breath and wheezing.    Cardiovascular: Negative for chest pain and palpitations.   Gastrointestinal: Positive for abdominal pain and nausea. Negative for vomiting.   Endocrine: Negative for hot flashes.   Genitourinary: Positive for frequency, pelvic pain and vaginal discharge. Negative for dysmenorrhea, dyspareunia, urgency, vaginal bleeding and postcoital bleeding.   Musculoskeletal: Positive for back pain. Negative for myalgias.   Integumentary:  Negative for rash, breast mass and nipple discharge.   Neurological: Positive for headaches. Negative for seizures.   Psychiatric/Behavioral: Negative for depression and sleep disturbance. The patient is not nervous/anxious.    Breast: Negative for mass, mastodynia and nipple discharge     Objective:     Vital Signs (Most Recent):  Temp: 98.1 °F (36.7 °C) (11/11/20 0247)  Pulse: 65 (11/11/20 0247)  Resp: 18 (11/11/20 0247)  BP: 97/63 (11/11/20 0247)  SpO2: 99 % (11/11/20 0247) Vital Signs (24h Range):  Temp:  [98.1 °F (36.7 °C)] 98.1 °F (36.7 °C)  Pulse:  [65] 65  Resp:  [18] 18  SpO2:  [99 %] 99 %  BP: (97)/(63) 97/63     Weight: 57.6 kg (127 lb)  Body mass index is 24 kg/m².    FHT: 150 Cat 1 (reassuring)  TOCO:  Q 3 minutes    Physical Exam:   Constitutional: She appears well-developed and well-nourished. No distress.    HENT:   Head: Normocephalic and atraumatic.    Eyes: EOM are normal.    Neck: Normal range of motion.    Cardiovascular: Normal rate.     Pulmonary/Chest: Effort normal. No respiratory distress.        Abdominal: Soft. She exhibits no distension. There is no abdominal tenderness. There is no rebound and no  guarding.   Gravid               Musculoskeletal: Normal range of motion.       Neurological: She is alert.    Skin: Skin is warm and dry.    Psychiatric: She has a normal mood and affect.       Cervix:  Dilation:  4  Effacement:  75%  Station: -2  Presentation: Vertex     Significant Labs:  Lab Results   Component Value Date    GROUPTRH B POS 11/11/2020    HEPBSAG Negative 05/25/2020    STREPBCULT No Group B Streptococcus isolated 10/19/2020       CBC:   Recent Labs   Lab 11/11/20  0250   WBC 9.37   RBC 4.29   HGB 12.6   HCT 38.0      MCV 89   MCH 29.4   MCHC 33.2     I have personallly reviewed all pertinent lab results from the last 24 hours.

## 2020-11-12 LAB
BASOPHILS # BLD AUTO: 0.05 K/UL (ref 0–0.2)
BASOPHILS NFR BLD: 0.4 % (ref 0–1.9)
DIFFERENTIAL METHOD: ABNORMAL
EOSINOPHIL # BLD AUTO: 0.2 K/UL (ref 0–0.5)
EOSINOPHIL NFR BLD: 1.8 % (ref 0–8)
ERYTHROCYTE [DISTWIDTH] IN BLOOD BY AUTOMATED COUNT: 13.6 % (ref 11.5–14.5)
HCT VFR BLD AUTO: 35.3 % (ref 37–48.5)
HGB BLD-MCNC: 11.4 G/DL (ref 12–16)
IMM GRANULOCYTES # BLD AUTO: 0.09 K/UL (ref 0–0.04)
IMM GRANULOCYTES NFR BLD AUTO: 0.7 % (ref 0–0.5)
LYMPHOCYTES # BLD AUTO: 1.7 K/UL (ref 1–4.8)
LYMPHOCYTES NFR BLD: 14.1 % (ref 18–48)
MCH RBC QN AUTO: 28.8 PG (ref 27–31)
MCHC RBC AUTO-ENTMCNC: 32.3 G/DL (ref 32–36)
MCV RBC AUTO: 89 FL (ref 82–98)
MONOCYTES # BLD AUTO: 1 K/UL (ref 0.3–1)
MONOCYTES NFR BLD: 8 % (ref 4–15)
NEUTROPHILS # BLD AUTO: 9 K/UL (ref 1.8–7.7)
NEUTROPHILS NFR BLD: 75 % (ref 38–73)
NRBC BLD-RTO: 0 /100 WBC
PLATELET # BLD AUTO: 165 K/UL (ref 150–350)
PMV BLD AUTO: 9.8 FL (ref 9.2–12.9)
RBC # BLD AUTO: 3.96 M/UL (ref 4–5.4)
WBC # BLD AUTO: 12.02 K/UL (ref 3.9–12.7)

## 2020-11-12 PROCEDURE — 99231 PR SUBSEQUENT HOSPITAL CARE,LEVL I: ICD-10-PCS | Mod: ,,, | Performed by: OBSTETRICS & GYNECOLOGY

## 2020-11-12 PROCEDURE — 85025 COMPLETE CBC W/AUTO DIFF WBC: CPT

## 2020-11-12 PROCEDURE — 36415 COLL VENOUS BLD VENIPUNCTURE: CPT

## 2020-11-12 PROCEDURE — 99231 SBSQ HOSP IP/OBS SF/LOW 25: CPT | Mod: ,,, | Performed by: OBSTETRICS & GYNECOLOGY

## 2020-11-12 PROCEDURE — 11000001 HC ACUTE MED/SURG PRIVATE ROOM

## 2020-11-12 PROCEDURE — 25000003 PHARM REV CODE 250: Performed by: OBSTETRICS & GYNECOLOGY

## 2020-11-12 RX ADMIN — IBUPROFEN 600 MG: 600 TABLET, FILM COATED ORAL at 12:11

## 2020-11-12 RX ADMIN — IBUPROFEN 600 MG: 600 TABLET, FILM COATED ORAL at 06:11

## 2020-11-12 RX ADMIN — IBUPROFEN 600 MG: 600 TABLET, FILM COATED ORAL at 05:11

## 2020-11-12 NOTE — L&D DELIVERY NOTE
[facility] [place]  Vaginal Delivery   Operative Note    SUMMARY     Normal spontaneous vaginal delivery of live infant, was placed on mothers abdomen for skin to skin and bulb suctioning performed.  Infant delivered position OA over perineum.  Nuchal cord: Yes, cord reduced at perineum.    Spontaneous delivery of placenta and IV pitocin given noting good uterine tone.  2nd degree laceration noted and repaired in normal fashion with 3.0 chromic.  Patient tolerated delivery well. Sponge needle and lap counted correctly x2.    Indications: 39 weeks gestation of pregnancy  Pregnancy complicated by:   Patient Active Problem List   Diagnosis    Language barrier- Chinese    History of ectopic pregnancy    Myopic astigmatism, bilateral    Status post normal delivery in completely normal case     Admitting GA: 39w4d    Delivery Information for Boy Anne Arenas    Birth information:  YOB: 2020   Time of birth: 12:11 PM   Sex: male   Head Delivery Date/Time: 11/11/2020 12:11 PM   Delivery type: Vaginal, Spontaneous   Gestational Age: 39w4d    Delivery Providers    Delivering clinician: Nahun Mendiola MD   Provider Role    YELENA Butler RN Kaitlynne Toups, RN             Measurements    Weight: 3350 g  Length:          Apgars    Living status: Living  Apgars:  1 min.:  5 min.:  10 min.:  15 min.:  20 min.:    Skin color:  1  1       Heart rate:  2  2       Reflex irritability:  2  2       Muscle tone:  2  2       Respiratory effort:  2  2       Total:  9  9              Operative Delivery    Forceps attempted?: No  Vacuum extractor attempted?: No         Shoulder Dystocia    Shoulder dystocia present?: No           Presentation    Presentation: Vertex           Interventions/Resuscitation    Method: Bulb Suctioning, Tactile Stimulation       Cord    Vessels: 3 vessels  Complications: Nuchal  Nuchal Intervention: reduced  Nuchal Cord Description: loose nuchal  cord  Number of Loops: 1  Delayed Cord Clamping?: Yes  Cord Clamped Date/Time: 2020 12:11 PM  Cord Blood Disposition: Sent with Baby  Gases Sent?: No  Stem Cell Collection (by MD): No       Placenta    Placenta delivery date/time: 2020 1214  Placenta removal: Spontaneous  Placenta appearance: Intact  Placenta disposition: discarded           Labor Events:       labor: No     Labor Onset Date/Time:         Dilation Complete Date/Time:         Start Pushing Date/Time:         Start Pushing Date/Time:       Rupture Date/Time: 20         Rupture type:          Fluid Amount:       Fluid Color: Clear      Fluid Odor:       Membrane Status: ARM (Artificial Rupture)               steroids:       Antibiotics given for GBS: No     Induction:       Indications for induction:        Augmentation: amniotomy;oxytocin     Indications for augmentation:       Labor complications: None     Additional complications:          Cervical ripening:                     Delivery:      Episiotomy: None     Indication for Episiotomy:       Perineal Lacerations: 2nd Repaired:  Yes   Periurethral Laceration:   Repaired:     Labial Laceration:   Repaired:     Sulcus Laceration:   Repaired:     Vaginal Laceration:   Repaired:     Cervical Laceration:   Repaired:     Repair suture:       Repair # of packets: 1     Last Value - EBL - Nursing (mL):       Sum - EBL - Nursing (mL): 0     Last Value - EBL - Anesthesia (mL):      Calculated QBL (mL): 265      Vaginal Sweep Performed: Yes     Surgicount Correct: Yes       Other providers:       Anesthesia    Method: Epidural          Details (if applicable):  Trial of Labor      Categorization:      Priority:     Indications for :     Incision Type:       Additional  information:  Forceps:    Vacuum:    Breech:    Observed anomalies    Other (Comments):

## 2020-11-12 NOTE — SUBJECTIVE & OBJECTIVE
Interval History:   Status post vaginal delivery on 11/11/2020    She is doing well this morning. She is tolerating a regular diet without nausea or vomiting. She is voiding spontaneously. She is ambulating. She has passed flatus, and has not a BM. Vaginal bleeding is mild. She denies fever or chills. Abdominal pain is mild and controlled with oral medications. She is breastfeeding. She desires circumcision for her male baby: yes.    Objective:     Vital Signs (Most Recent):  Temp: 96.4 °F (35.8 °C) (11/12/20 0533)  Pulse: 62 (11/12/20 0533)  Resp: 16 (11/12/20 0533)  BP: 100/72 (11/12/20 0533)  SpO2: 98 % (11/12/20 0533) Vital Signs (24h Range):  Temp:  [96.3 °F (35.7 °C)-98.6 °F (37 °C)] 96.4 °F (35.8 °C)  Pulse:  [60-87] 62  Resp:  [16-18] 16  SpO2:  [97 %-100 %] 98 %  BP: ()/(51-72) 100/72     Weight: 57.6 kg (127 lb)  Body mass index is 24 kg/m².      Intake/Output Summary (Last 24 hours) at 11/12/2020 0713  Last data filed at 11/12/2020 0639  Gross per 24 hour   Intake 720 ml   Output 865 ml   Net -145 ml           Significant Labs:  Lab Results   Component Value Date    GROUPTRH B POS 11/11/2020    HEPBSAG Negative 05/25/2020    STREPBCULT No Group B Streptococcus isolated 10/19/2020     Recent Labs   Lab 11/12/20  0450   HGB 11.4*   HCT 35.3*       CBC:   Recent Labs   Lab 11/12/20  0450   WBC 12.02   RBC 3.96*   HGB 11.4*   HCT 35.3*      MCV 89   MCH 28.8   MCHC 32.3     I have personallly reviewed all pertinent lab results from the last 24 hours.    Physical Exam:   Constitutional: She appears well-developed and well-nourished. No distress.    HENT:   Head: Normocephalic and atraumatic.    Eyes: EOM are normal.    Neck: Normal range of motion.    Cardiovascular: Normal rate.     Pulmonary/Chest: Effort normal. No respiratory distress.        Abdominal: Soft. She exhibits no distension. There is no abdominal tenderness. There is no rebound and no guarding.   Fundus firm at umb              Musculoskeletal: Normal range of motion.       Neurological: She is alert.    Skin: Skin is warm and dry.    Psychiatric: She has a normal mood and affect.

## 2020-11-12 NOTE — ASSESSMENT & PLAN NOTE
Routine postpartum care  Watch vaginal bleeding  Pain control  Lactation assistance  Discharge home tomorrow, 11/13/2020

## 2020-11-12 NOTE — PLAN OF CARE
Plan of care reviewed. Fundus firm. Bonding with infant. Frequent checks made to ensure safety and comfort. Bed low, call bell within reach. Will continue to monitor.  Ibuprofen effective for perineal discomfort, and cramping.

## 2020-11-12 NOTE — LACTATION NOTE
This note was copied from a baby's chart.  Attempted to latch infant to right breast both in cradle and football hold. Mother has had surgery to right breast. Mom requesting infant to have formula at this time. ( states will try again when she goes home)    Reviewed the risks of supplementation.  Discussed the adequacy of colostrum.  Instructed on normal  feeding and sleeping patterns.  Encouraged mother to feed the infant on cue, a minimum of 8 times in 24 hours prior to supplementation to promote appropriate breast stimulation for adequate milk supply.  Discussed preferred alternative feeding methods, such as supplementing the infant via breast with SNS, syringe feeding, cup feeding, spoon feeding and finger feeding.  Discussed risks and encouraged to avoid artificial bottles and nipples.  Chooses to supplement via bottle.  Safely taught how to feed infant via chosen method.  Demonstrated by nurse and pt return demonstrates proper and safe usage.  States understand and provided appropriate recall of all information.    Instructed on the AAP recommendation of exclusive breastfeeding for the first 6 months of life and continued breastfeeding with the introduction of supplemental foods beyond the first year of life.  Instructed on the recommendation to delay all bottle and pacifier use until after 4 weeks of age and breastfeeding is well established.  Discussed the benefits of exclusive breastfeeding for both mother and baby.  Discussed the risks of supplementation/pacifier use on the exclusivity of breastfeeding in the first 6 months.  Pt states understanding and verbalized appropriate recall.    Instructed on the risks of formula feeding including:   Lacks the nutrients found in colostrums to help prevent infection, mature the gut, aid in digestion and resist allergies   Contains artificial additives and preservatives which increases incidence of contamination   Increase spitting up due to slower  digestion   Increased cost and requires preparation, including bottle sanitation and formula refrigeration   Increased incidence of NEC for the  baby   Increased risk of diabetes with family history, SIDS and ear infections   Skipped feedings for the breastfeeding mother increases chance of engorgement, mastitis and plugged ducts   Decreases breastfeeding babys appetite resulting in poor feeding session, decreased breast stimulation and poor milk supply   Exposes the breastfeeding baby to the possibility of allergic reactions and colic  Pt states understanding and verbalized appropriate recall.

## 2020-11-12 NOTE — PROGRESS NOTES
[facility]  Obstetrics  Postpartum Progress Note    Patient Name: Anne Arenas  MRN: [id number]  Admission Date: 2020  Hospital Length of Stay: 1 days  Attending Physician: Nahun Mendiola MD  Primary Care Provider: Mook Ferreira MD    Subjective:     Principal Problem:39 weeks gestation of pregnancy    Hospital Course:  Pitocin  AROM at 0715 2020.  Clear with cervix at 4/80%/-2/vertex  Complete at 1030    Viable male infant 1211 2020  Weight: 3.35 kg (7 lb 6.2 oz)  Apgar: 9/9  Placenta: spontaneous and intact with three vessels  Cytotec given for postpartum hemorrhage  Perineal laceration. Repaired with 3.0 chromic  EBL: 450 cc  No complication  No specimen    Nahun Mendiola MD    Interval History:   Status post vaginal delivery on 2020    She is doing well this morning. She is tolerating a regular diet without nausea or vomiting. She is voiding spontaneously. She is ambulating. She has passed flatus, and has not a BM. Vaginal bleeding is mild. She denies fever or chills. Abdominal pain is mild and controlled with oral medications. She is breastfeeding. She desires circumcision for her male baby: yes.    Objective:     Vital Signs (Most Recent):  Temp: 96.4 °F (35.8 °C) (20)  Pulse: 62 (20)  Resp: 16 (20)  BP: 100/72 (20)  SpO2: 98 % (20) Vital Signs (24h Range):  Temp:  [96.3 °F (35.7 °C)-98.6 °F (37 °C)] 96.4 °F (35.8 °C)  Pulse:  [60-87] 62  Resp:  [16-18] 16  SpO2:  [97 %-100 %] 98 %  BP: ()/(51-72) 100/72     Weight: 57.6 kg (127 lb)  Body mass index is 24 kg/m².      Intake/Output Summary (Last 24 hours) at 2020 0713  Last data filed at 2020 0639  Gross per 24 hour   Intake 720 ml   Output 865 ml   Net -145 ml           Significant Labs:  Lab Results   Component Value Date    GROUPTRH B POS 2020    HEPBSAG Negative 2020    STREPBCULT No Group B Streptococcus isolated 10/19/2020      Recent Labs   Lab 20  0450   HGB 11.4*   HCT 35.3*       CBC:   Recent Labs   Lab 20  0450   WBC 12.02   RBC 3.96*   HGB 11.4*   HCT 35.3*      MCV 89   MCH 28.8   MCHC 32.3     I have personallly reviewed all pertinent lab results from the last 24 hours.    Physical Exam:   Constitutional: She appears well-developed and well-nourished. No distress.    HENT:   Head: Normocephalic and atraumatic.    Eyes: EOM are normal.    Neck: Normal range of motion.    Cardiovascular: Normal rate.     Pulmonary/Chest: Effort normal. No respiratory distress.        Abdominal: Soft. She exhibits no distension. There is no abdominal tenderness. There is no rebound and no guarding.   Fundus firm at umb             Musculoskeletal: Normal range of motion.       Neurological: She is alert.    Skin: Skin is warm and dry.    Psychiatric: She has a normal mood and affect.       Assessment/Plan:     35 y.o. female  for:    Status post normal delivery in completely normal case  Routine postpartum care  Watch vaginal bleeding  Pain control  Lactation assistance  Discharge home tomorrow, 2020          Disposition: As patient meets milestones, will plan to discharge home.    Nahun Mendiola MD  Obstetrics  [facility]

## 2020-11-12 NOTE — LACTATION NOTE
"   11/12/20 1020   Pain/Comfort/Sleep   Pain Body Location - Side Bilateral   Pain Body Location breast   Pain Rating (0-10): Activity 6   Breasts WDL   Breast WDL WDL   Right Nipple Symptoms scarring   Maternal Feeding Assessment   Maternal Emotional State relaxed;assist needed   Infant Positioning cradle   Signs of Milk Transfer audible swallow;infant jaw motion present   Pain with Feeding yes   Latch Assistance yes   Pain Location nipple, right   Pain Description aching   Reproductive Interventions   Breastfeeding Assistance assisted with positioning;infant latch-on verified;infant suck/swallow verified;support offered   Breastfeeding Support encouragement provided;lactation counseling provided   MOderate assist with position and latch to right breast in cradle hold.  C/O nipple pain with latch 6/10.  Nutritive sucking noted x 8 min and then baby removed self from breast and pt requested formula.  Encouraged breastfeeding on demand, 8 -12 times in 24 hours.  Call for assist prn.  Requests to offer bottles of formula prn.  Discussed risks associated with formula feeding on the course of breastfeeding.  Cont to request formula.  Request honored.  Reviewed safe formula feeding.  States "understand" of all info.  "

## 2020-11-13 VITALS
HEART RATE: 69 BPM | HEIGHT: 61 IN | OXYGEN SATURATION: 99 % | WEIGHT: 127 LBS | SYSTOLIC BLOOD PRESSURE: 92 MMHG | TEMPERATURE: 97 F | DIASTOLIC BLOOD PRESSURE: 60 MMHG | BODY MASS INDEX: 23.98 KG/M2 | RESPIRATION RATE: 17 BRPM

## 2020-11-13 PROCEDURE — 25000003 PHARM REV CODE 250: Performed by: OBSTETRICS & GYNECOLOGY

## 2020-11-13 PROCEDURE — 99238 PR HOSPITAL DISCHARGE DAY,<30 MIN: ICD-10-PCS | Mod: ,,, | Performed by: OBSTETRICS & GYNECOLOGY

## 2020-11-13 PROCEDURE — 99238 HOSP IP/OBS DSCHRG MGMT 30/<: CPT | Mod: ,,, | Performed by: OBSTETRICS & GYNECOLOGY

## 2020-11-13 RX ORDER — IBUPROFEN 600 MG/1
600 TABLET ORAL EVERY 6 HOURS
Qty: 40 TABLET | Refills: 1 | Status: SHIPPED | OUTPATIENT
Start: 2020-11-13 | End: 2021-11-29

## 2020-11-13 RX ADMIN — ACETAMINOPHEN 650 MG: 325 TABLET ORAL at 09:11

## 2020-11-13 RX ADMIN — IBUPROFEN 600 MG: 600 TABLET, FILM COATED ORAL at 05:11

## 2020-11-13 RX ADMIN — IBUPROFEN 600 MG: 600 TABLET, FILM COATED ORAL at 12:11

## 2020-11-13 RX ADMIN — IBUPROFEN 600 MG: 600 TABLET, FILM COATED ORAL at 11:11

## 2020-11-13 NOTE — LACTATION NOTE
"   11/13/20 0930   Pain/Comfort/Sleep   Pain Body Location - Side Bilateral   Pain Body Location breast   Pain Rating (0-10): Activity 3   Breasts WDL   Breast WDL WDL   Maternal Feeding Assessment   Maternal Emotional State relaxed;independent   Reproductive Interventions   Breastfeeding Support encouragement provided;lactation counseling provided     No longer wants to breastfeed; formula feeding only.  Instructed on non-nursing engorgement precautions given.  Denies any c/o or concerns.  Hotline # given.  States "understand".  "

## 2020-11-13 NOTE — PLAN OF CARE
Plan of care reviewed. Fundus firm. Bonding with infant.  Formula fed infant this shift. Tucks and dermaplast to perineum. Motrin effective for discomfort. Frequent checks made to ensure safety and comfort. Bed low, call bell within reach. Will continue to monitor.

## 2020-11-13 NOTE — DISCHARGE INSTRUCTIONS
Pumping Instructions :    Preparation and Hygiene:    1. Shower daily.  2. Wear a clean bra each day and wash daily in warm soapy water.  3. Change wet or moist breast pads frequently.  Moist pads can promote growth of germs.  4. Actively wash your hands, paying close attention to the area around and under your fingernails, thoroughly with soap and water for 15 seconds before pumping or handling your milk.  Re-wash your hands if you touch anything (scratching your nose, answering the phone, etc) while pumping or handling your milk.   5. Before pumping your breasts, assemble the pump collection kit and have ready the sterile container and labels.  Place these items on a clean surface next to the breastpump.  6. Each time after you have finished pumping, take apart all of the parts of the breastpump collection kit and place them in a separate cleaning container (do not place them in the sink).  Be sure to remove the yellow valve from the breastshield and separate the white membrane from the yellow valve.  Rinse all of these parts with cool water.  Then use a new sponge and/or bottle brush and dishwashing detergent to clean the parts.  Rinse off the soapy water with cool water and air dry on a clean towel covered with a clean cloth.  All parts may also be washed after each use in the top rack of a .  7. Once each day, sterilize all of the parts of the breastpump collection kit.  This can be done by boiling the kit parts for 10 minutes or by using a Quick Clean Micro-Steam Bag made by [employer]  8. If condensation appears in the tubing, continue to run the pump with the tubing attached for 1-2 minutes or until the tubing is dry.   9. Notify your babys nurse or doctor if you become ill or need to take any medication, even over-the-counter medicines.        Collection and Storage of Expressed Breastmilk:         1. Pump your breasts at least 8-10 times every 24 hours.  Double pump (both breasts at   the same time) for at least 15-20 minutes using the most suction that is comfortable.    2. Write the date and time of pumping and the name of any medications you are takingon the babys pre-printed hospital identification label.   3.    Do not touch the inside of the storage containers or lids.      4.        Tightly screw the lid onto the container and place immediately into the                                refrigerator for daily use.  Bottle may remain at room temperature if the next                    feeding is within 4 hours.  5.    Expressed breastmilk should be refrigerated or frozen within 4 hours of                pumping.  6.        Do not store expressed breastmilk on the door of your refrigerator or freeze             where the temperature is warmer.   7.        Refrigerated milk may be stored in for up to 7 days.  At this point it can be              moved to the freezer for 6 -12 months.  8.        Thaw frozen breast milk in overnight in the refrigerator.  Once milk is thawed it              must be used within 24 hours.  9.        Refrigerated breast milk needs to be warmed to room temperature.  Warm by leaving unrefrigerated until it reached room temperature, or place sealed bottle into a cup of warm (not boiling) water or use a bottle warmer.               Never warm breast milk in a microwave or boiling water.    For any questions or concerns call:  Lactation Department at [phone]      Management of Engorgement in the Non-Nursing Mother** If you choose not to breastfeed**    Your breastmilk will usually come in within 3-5 days after delivery even if you have decided not to breastfeed.  Your breasts may become full, lumpy, hard and uncomfortable due to the glands filling with milk and swelling of the breast tissue, blood vessels, and lymph glands.  This is a temporary condition usually lasting 24-48 hrs.  If your breasts become uncomfortable during this time, you may use some or all of the  comfort measures described below to obtain relief.      Measures to relieve discomfort:    1. Wear a well fitting supportive bra. It should not be too tight or it may lead to plugged ducts or a breast infection.  (We do not recommend that you bind your breasts with any type of wrap.)    2. If the breasts begin to feel heavy, warm or uncomfortably full, begin using cold compresses on your breasts. Cold compresses can relieve the swelling and provide comfort.  Cold application can be in the form of ice packs, gel packs, frozen bags of vegetables or frozen wet towels. Cold compresses should be  wrapped in a thin towel or a pillow case and applied to the breasts  for 20 minutes at a time. This process can be repeated with a short break in between the applications of cold compresses until the swelling is relieved.     3.  Cold cabbage compresses can also be used to relieve pain and swelling.  Chilled cabbage leaves show similar pain reducing effects as cold compresses.  Some mothers prefer the cabbage leaves due to a stronger, more immediate effect. Cabbage leave effects are not clinically proven but many mothers find them very soothing.    How to apply chilled cabbage compresses:  rinse with cool water and refrigerate raw cabbage leaves.  Strip the large vein off the cold cabbage leaf and put the remaining part of the cabbage leaf directly on the breast. The leaves should be worn inside the bra until they wilt, usually within 2-4 hours.  When they wilt they should be replaced with fresh leaves.   If redness,  rash, or itching occur stop use immediately.    4. Anti-inflammatory medications such as ibuprophen may be taken, with your physicians approval, to reduce inflammation and discomfort.     6. If fullness persists and remains painful even after all of the above measures are used, hand expressing a small amount of milk out of the breasts can provide an extra measure of comfort.  Warm showers, letting the warm water  hit your back and roll over your shoulders to the breasts, while you gently express milk can make hand expressing a little easier. You should only remove enough milk to provide comfort and relief.   Repeat this process as often as needed to keep the breasts comfortable.    7. You can pump your breasts and remove just enough milk to feel softer, but not so much that more milk production is stimulated.   Repeat this process as often as needed to keep the breasts comfortable.    8. There is no need to limit the amount of fluids that you drink.     9. Engorgement will usually get better within 24-48 hrs; however, there may be some fullness and/or leaking of milk for several days. Wear breast pads to absorb any leaking milk and change them frequently.    10. If you have any flu-like symptoms such as fever, chills, feeling tired and achy or red areas on your breast, call your physician immediately.    11.Call the [facility], [phone], if the engorgement is not relieved or if you have questions.      After vaginal delivery:    Regular diet  Drink 6-8 glasses of water a day  Shower only for next 6 weeks.  If perineum sore, may soak in a few inches of warm water in tub.  No lifting anything more than 10 pounds.  No driving for 1 week  Walking is the only exercise allowed for 6 weeks  No sexual intercourse, no tampons, no douching for 6 weeks  Discuss with your doctor your birth control preferences at next visit  Wear supportive bra    Notify doctor if you have:  -Fever of 101 or higher  -Persistent nausea and vomiting  -Heavy vaginal bleeding or clots  -Swelling and pain in arms or legs  -Severe headaches, blurred vision, or fainting  -Frequency and burning with urination

## 2020-11-13 NOTE — PLAN OF CARE
Patient alert and oriented with even & unlabored respirations. Pain managed with scheduled and/or PRN pain medicine. POC discussed with the patient, and the patient verbalized understanding. Discharge orders in place.

## 2020-11-13 NOTE — DISCHARGE SUMMARY
[facility] [place]  Obstetrics  Discharge Summary      Patient Name: Anne Arenas  MRN: [id number]  Admission Date: 2020  Hospital Length of Stay: 2 days  Discharge Date and Time: 2020  Attending Physician: Katharine Mendiola MD   Discharging Provider: Katharine Mendiola MD   Primary Care Provider: Mook Ferreira MD    HPI: 36 yo  at 39w4d  Advanced maternal age  Glucose intolerance in pregnancy  History of uterine inversion with her last delivery  Admitted today, 2020 for labor induction with favorable cervix  Risks and benefits discussed        * No surgery found *     Hospital Course:   Pitocin  AROM at 0715 2020.  Clear with cervix at 4/80%/-2/vertex  Complete at 1030    Viable male infant 1211 2020  Weight: 3.35 kg (7 lb 6.2 oz)  Apgar: 9/9  Placenta: spontaneous and intact with three vessels  Cytotec given for postpartum hemorrhage  Perineal laceration. Repaired with 3.0 chromic  EBL: 450 cc  No complication  No specimen    Postpartum course was benign.  She is breast-feeding with supplementation.  Exam was benign with patient afebrile, vitals stable, and minimal bleeding.  Tender over coccyx  Normal activities.  Patient discharged home on postpartum day #2, 2020   Discharge medications include Motrin, prenatal vitamins, and iron supplement.  Follow-up with me in 6 weeks.    Katharine Mendiola MD.     Consults (From admission, onward)        Status Ordering Provider     Inpatient consult to Anesthesiology  Once     Provider:  Fidel Henao MD    Acknowledged KATHARINE MENDIOLA.     Inpatient consult to Lactation  Once     Provider:  (Not yet assigned)    Completed KATHARINE MENDIOLA.          Final Active Diagnoses:    Diagnosis Date Noted POA    Status post normal delivery in completely normal case [O80] 2020 Not Applicable      Problems Resolved During this Admission:    Diagnosis Date Noted Date Resolved POA    PRINCIPAL PROBLEM:  39 weeks gestation of pregnancy [Z3A.39]  11/11/2020 11/11/2020 Not Applicable    Advanced maternal age in multigravida, third trimester [O09.523] 11/11/2020 11/11/2020 Yes        Significant Diagnostic Studies: Labs: All labs within the past 24 hours have been reviewed      Feeding Method: both breast and bottle    Immunizations     None          Delivery:    Episiotomy: None   Lacerations: 2nd   Repair suture:     Repair # of packets: 1   Blood loss (ml):       Birth information:  YOB: 2020   Time of birth: 12:11 PM   Sex: male   Delivery type: Vaginal, Spontaneous   Gestational Age: 39w4d    Delivery Clinician:      Other providers:       Additional  information:  Forceps:    Vacuum:    Breech:    Observed anomalies      Living?:           APGARS  One minute Five minutes Ten minutes   Skin color:         Heart rate:         Grimace:         Muscle tone:         Breathing:         Totals: 9  9        Placenta: Delivered:       appearance    Pending Diagnostic Studies:     None          Discharged Condition: good    Disposition: Home or Self Care    Follow Up:  Follow-up Information     Nahun Mendiola MD In 6 weeks.    Specialties: Obstetrics and Gynecology, Obstetrics and Gynecology  Contact information:  [address]  [address] [place], [place] [id number] [phone]                 Patient Instructions:      BREAST PUMP FOR HOME USE     Order Specific Question Answer Comments   Type of pump: Electric    Weight: 57.6 kg (127 lb)    Does patient have medical equipment at home? none    Length of need (1-99 months): 99      No dressing needed     Medications:  Current Discharge Medication List      START taking these medications    Details   ibuprofen (ADVIL,MOTRIN) 600 MG tablet Take 1 tablet (600 mg total) by mouth every 6 (six) hours.  Qty: 40 tablet, Refills: 1         CONTINUE these medications which have NOT CHANGED    Details   ferrous gluconate (FERGON) 324 MG tablet Take 1 tablet (324 mg total) by mouth daily with breakfast.  Qty:       Associated Diagnoses: 36 weeks gestation of pregnancy      prenatal 26-iron ps-folic-dha (VITAFOL-ONE) 29 mg iron- 1 mg-200 mg Cap Take 1 capsule by mouth once daily.  Qty: 30 capsule, Refills: 6    Associated Diagnoses: 36 weeks gestation of pregnancy             Nahun Mendiola MD  Obstetrics  [facility]

## 2020-11-19 ENCOUNTER — TELEPHONE (OUTPATIENT)
Dept: OBSTETRICS AND GYNECOLOGY | Facility: CLINIC | Age: 35
End: 2020-11-19

## 2020-11-19 NOTE — TELEPHONE ENCOUNTER
Spoke with patient and gave patient the number to our lactation consultant at [phone]. Patient confirmed number and denies any additional questions at this time.

## 2020-11-19 NOTE — TELEPHONE ENCOUNTER
----- Message from Sil Moe sent at 11/18/2020  1:21 PM CST -----  Regarding: Self/  [phone]  Type: Patient Call Back    Who called:  Patient    What is the request in detail:  Pt is needing staff to give her a call, she stated something is wrong with her nipple when she starts breastfeeding her baby.  She stated when she used the breast pump blood started coming out and it's swollen.  Thank you    Would the patient rather a call back or a response via [facility]?   Call back    Best call back number:  [phone] (home)       Thank you

## 2021-10-18 ENCOUNTER — TELEPHONE (OUTPATIENT)
Dept: FAMILY MEDICINE | Facility: CLINIC | Age: 36
End: 2021-10-18

## 2021-11-29 ENCOUNTER — OFFICE VISIT (OUTPATIENT)
Dept: OBSTETRICS AND GYNECOLOGY | Facility: CLINIC | Age: 36
End: 2021-11-29
Payer: MEDICAID

## 2021-11-29 VITALS
SYSTOLIC BLOOD PRESSURE: 100 MMHG | BODY MASS INDEX: 19.57 KG/M2 | WEIGHT: 103.63 LBS | DIASTOLIC BLOOD PRESSURE: 60 MMHG | HEIGHT: 61 IN

## 2021-11-29 DIAGNOSIS — Z01.419 WELL WOMAN EXAM WITH ROUTINE GYNECOLOGICAL EXAM: Primary | ICD-10-CM

## 2021-11-29 PROCEDURE — 99999 PR PBB SHADOW E&M-EST. PATIENT-LVL II: CPT | Mod: PBBFAC,,, | Performed by: OBSTETRICS & GYNECOLOGY

## 2021-11-29 PROCEDURE — 88175 CYTOPATH C/V AUTO FLUID REDO: CPT | Performed by: OBSTETRICS & GYNECOLOGY

## 2021-11-29 PROCEDURE — 99212 OFFICE O/P EST SF 10 MIN: CPT | Mod: PBBFAC | Performed by: OBSTETRICS & GYNECOLOGY

## 2021-11-29 PROCEDURE — 99395 PREV VISIT EST AGE 18-39: CPT | Mod: S$PBB,,, | Performed by: OBSTETRICS & GYNECOLOGY

## 2021-11-29 PROCEDURE — 87624 HPV HI-RISK TYP POOLED RSLT: CPT | Performed by: OBSTETRICS & GYNECOLOGY

## 2021-11-29 PROCEDURE — 99395 PR PREVENTIVE VISIT,EST,18-39: ICD-10-PCS | Mod: S$PBB,,, | Performed by: OBSTETRICS & GYNECOLOGY

## 2021-11-29 PROCEDURE — 99999 PR PBB SHADOW E&M-EST. PATIENT-LVL II: ICD-10-PCS | Mod: PBBFAC,,, | Performed by: OBSTETRICS & GYNECOLOGY

## 2021-12-03 LAB
FINAL PATHOLOGIC DIAGNOSIS: NORMAL
HPV HR 12 DNA SPEC QL NAA+PROBE: NEGATIVE
HPV16 AG SPEC QL: NEGATIVE
HPV18 DNA SPEC QL NAA+PROBE: NEGATIVE
Lab: NORMAL

## 2022-11-08 ENCOUNTER — PATIENT MESSAGE (OUTPATIENT)
Dept: FAMILY MEDICINE | Facility: CLINIC | Age: 37
End: 2022-11-08
Payer: MEDICAID

## 2022-11-09 ENCOUNTER — OFFICE VISIT (OUTPATIENT)
Dept: FAMILY MEDICINE | Facility: CLINIC | Age: 37
End: 2022-11-09
Payer: MEDICAID

## 2022-11-09 VITALS
SYSTOLIC BLOOD PRESSURE: 90 MMHG | DIASTOLIC BLOOD PRESSURE: 62 MMHG | WEIGHT: 107.25 LBS | TEMPERATURE: 99 F | HEART RATE: 65 BPM | HEIGHT: 61 IN | BODY MASS INDEX: 20.25 KG/M2 | OXYGEN SATURATION: 99 %

## 2022-11-09 DIAGNOSIS — Z00.00 ROUTINE PHYSICAL EXAMINATION: Primary | ICD-10-CM

## 2022-11-09 DIAGNOSIS — Z23 NEED FOR IMMUNIZATION AGAINST INFLUENZA: ICD-10-CM

## 2022-11-09 DIAGNOSIS — R05.9 COUGH, UNSPECIFIED TYPE: ICD-10-CM

## 2022-11-09 DIAGNOSIS — I78.1 SPIDER VEINS: ICD-10-CM

## 2022-11-09 PROCEDURE — 3074F SYST BP LT 130 MM HG: CPT | Mod: CPTII,,, | Performed by: FAMILY MEDICINE

## 2022-11-09 PROCEDURE — 3008F BODY MASS INDEX DOCD: CPT | Mod: CPTII,,, | Performed by: FAMILY MEDICINE

## 2022-11-09 PROCEDURE — 90686 IIV4 VACC NO PRSV 0.5 ML IM: CPT | Mod: PBBFAC,PO

## 2022-11-09 PROCEDURE — 3008F PR BODY MASS INDEX (BMI) DOCUMENTED: ICD-10-PCS | Mod: CPTII,,, | Performed by: FAMILY MEDICINE

## 2022-11-09 PROCEDURE — 3074F PR MOST RECENT SYSTOLIC BLOOD PRESSURE < 130 MM HG: ICD-10-PCS | Mod: CPTII,,, | Performed by: FAMILY MEDICINE

## 2022-11-09 PROCEDURE — 3078F PR MOST RECENT DIASTOLIC BLOOD PRESSURE < 80 MM HG: ICD-10-PCS | Mod: CPTII,,, | Performed by: FAMILY MEDICINE

## 2022-11-09 PROCEDURE — 99204 PR OFFICE/OUTPT VISIT, NEW, LEVL IV, 45-59 MIN: ICD-10-PCS | Mod: S$PBB,,, | Performed by: FAMILY MEDICINE

## 2022-11-09 PROCEDURE — 3078F DIAST BP <80 MM HG: CPT | Mod: CPTII,,, | Performed by: FAMILY MEDICINE

## 2022-11-09 PROCEDURE — 99204 OFFICE O/P NEW MOD 45 MIN: CPT | Mod: S$PBB,,, | Performed by: FAMILY MEDICINE

## 2022-11-09 PROCEDURE — 99213 OFFICE O/P EST LOW 20 MIN: CPT | Mod: PBBFAC,PO | Performed by: FAMILY MEDICINE

## 2022-11-09 PROCEDURE — 99999 PR PBB SHADOW E&M-EST. PATIENT-LVL III: CPT | Mod: PBBFAC,,, | Performed by: FAMILY MEDICINE

## 2022-11-09 PROCEDURE — 99999 PR PBB SHADOW E&M-EST. PATIENT-LVL III: ICD-10-PCS | Mod: PBBFAC,,, | Performed by: FAMILY MEDICINE

## 2022-11-09 PROCEDURE — 1159F PR MEDICATION LIST DOCUMENTED IN MEDICAL RECORD: ICD-10-PCS | Mod: CPTII,,, | Performed by: FAMILY MEDICINE

## 2022-11-09 PROCEDURE — 1159F MED LIST DOCD IN RCRD: CPT | Mod: CPTII,,, | Performed by: FAMILY MEDICINE

## 2022-11-09 RX ORDER — PROMETHAZINE HYDROCHLORIDE AND DEXTROMETHORPHAN HYDROBROMIDE 6.25; 15 MG/5ML; MG/5ML
5 SYRUP ORAL EVERY 6 HOURS PRN
Qty: 180 ML | Refills: 0 | Status: SHIPPED | OUTPATIENT
Start: 2022-11-09 | End: 2022-11-19

## 2022-11-09 NOTE — PROGRESS NOTES
[facility]  Progress Note    SUBJECTIVE:     Chief Complaint   Patient presents with    Annual Exam       HPI   Anne Arenas  is a 37 y.o. female here to re establish care and for physical exam. Patient has no other new complaints/problems at this time.      Review of patient's allergies indicates:  No Known Allergies    Past Medical History:   Diagnosis Date    Hx of ectopic pregnancy 11/26/13    Ruptured right ectopic pregnancy     Past Surgical History:   Procedure Laterality Date    DILATION AND CURETTAGE OF UTERUS  2008    TAB    DILATION AND CURETTAGE OF UTERUS  2010    TAB    PELVIC LAPAROSCOPY Right 11/26/13    ruptured ectopic pregnancy    SALPINGECTOMY Right 11/26/13    Ruptured Ectopic pregnancy     Family History   Problem Relation Age of Onset    Hypertension Maternal Grandmother     Hyperlipidemia Maternal Grandmother     No Known Problems Paternal Grandfather     No Known Problems Paternal Grandmother     No Known Problems Maternal Grandfather     No Known Problems Father     No Known Problems Mother     No Known Problems Sister     No Known Problems Brother     No Known Problems Maternal Aunt     No Known Problems Maternal Uncle     No Known Problems Paternal Aunt     No Known Problems Paternal Uncle     Cancer Neg Hx     Amblyopia Neg Hx     Blindness Neg Hx     Cataracts Neg Hx     Diabetes Neg Hx     Glaucoma Neg Hx     Macular degeneration Neg Hx     Retinal detachment Neg Hx     Strabismus Neg Hx     Stroke Neg Hx     Thyroid disease Neg Hx      Social History     Tobacco Use    Smoking status: Never    Smokeless tobacco: Never   Substance Use Topics    Alcohol use: No    Drug use: No        Review of Systems   Constitutional:  Negative for chills, diaphoresis and fever.   HENT:  Negative for congestion, ear pain and sore throat.    Eyes:  Negative for photophobia and discharge.   Respiratory:  Positive for cough. Negative for shortness of breath and wheezing.    Cardiovascular:   Negative for chest pain and palpitations.   Gastrointestinal:  Negative for abdominal pain, constipation, diarrhea, nausea and vomiting.   Genitourinary:  Negative for dysuria and hematuria.   Musculoskeletal:  Negative for back pain and myalgias.   Skin:  Negative for itching and rash.   Neurological:  Negative for dizziness, sensory change, focal weakness, weakness and headaches.   All other systems reviewed and are negative.  OBJECTIVE:     Vitals:    11/09/22 1555   BP: 90/62   Pulse: 65   Temp: 98.5 °F (36.9 °C)     Body mass index is 20.27 kg/m².    Physical Exam  Constitutional:       General: She is not in acute distress.     Appearance: She is not diaphoretic.   HENT:      Head: Normocephalic and atraumatic.      Right Ear: Tympanic membrane and ear canal normal. No hemotympanum. Tympanic membrane is not perforated, erythematous or bulging.      Left Ear: Tympanic membrane and ear canal normal. No hemotympanum. Tympanic membrane is not perforated, erythematous or bulging.      Mouth/Throat:      Pharynx: No oropharyngeal exudate.   Eyes:      Conjunctiva/sclera: Conjunctivae normal.      Pupils: Pupils are equal, round, and reactive to light.   Neck:      Thyroid: No thyromegaly.   Cardiovascular:      Rate and Rhythm: Normal rate and regular rhythm.      Heart sounds: Normal heart sounds. No murmur heard.    No friction rub. No gallop.   Pulmonary:      Effort: Pulmonary effort is normal. No respiratory distress.      Breath sounds: Normal breath sounds. No wheezing or rales.   Abdominal:      General: Bowel sounds are normal. There is no distension.      Palpations: Abdomen is soft.      Tenderness: There is no abdominal tenderness. There is no guarding or rebound.   Musculoskeletal:         General: No tenderness. Normal range of motion.   Lymphadenopathy:      Cervical: No cervical adenopathy.   Skin:     General: Skin is warm.      Findings: No erythema or rash.   Neurological:      Mental Status: She  is alert and oriented to person, place, and time.       Old records were reviewed. Labs and/or images were independently reviewed.    ASSESSMENT     1. Routine physical examination    2. Need for immunization against influenza    3. Cough, unspecified type    4. Spider veins        PLAN:     Routine physical examination  -     CBC Auto Differential; Future  -     Comprehensive Metabolic Panel; Future  -     Hemoglobin A1C; Future  -     Lipid Panel; Future  -     TSH; Future  -     T4, Free; Future  -     We briefly discussed diet, exercise, and routine preventive exams. All questions and comments addressed.    Need for immunization against influenza  -     Influenza - Quadrivalent (PF)    Cough, unspecified type  -     promethazine-dextromethorphan (PROMETHAZINE-DM) 6.25-15 mg/5 mL Syrp; Take 5 mLs by mouth every 6 (six) hours as needed (cough).  Dispense: 180 mL; Refill: 0  -     OK to take tylenol as needed PRN fever. Take mucinex and or claritin to help decrease congestion. Educated patient to drink plenty of fluids and to take vitamin C to help boost immune system. Instructed patient to call or RTC if symptoms persist or worsen.    Spider veins  -     Ambulatory referral/consult to Vascular Surgery; Future; Expected date: 11/16/2022  -     for possible laser surgery.      RTC PRN    Mook Ferreira MD  11/09/2022 4:12 PM

## 2022-11-10 ENCOUNTER — LAB VISIT (OUTPATIENT)
Dept: LAB | Facility: HOSPITAL | Age: 37
End: 2022-11-10
Attending: FAMILY MEDICINE
Payer: MEDICAID

## 2022-11-10 DIAGNOSIS — Z00.00 ROUTINE PHYSICAL EXAMINATION: ICD-10-CM

## 2022-11-10 LAB
ALBUMIN SERPL BCP-MCNC: 3.6 G/DL (ref 3.5–5.2)
ALP SERPL-CCNC: 25 U/L (ref 55–135)
ALT SERPL W/O P-5'-P-CCNC: 12 U/L (ref 10–44)
ANION GAP SERPL CALC-SCNC: 8 MMOL/L (ref 8–16)
AST SERPL-CCNC: 17 U/L (ref 10–40)
BASOPHILS # BLD AUTO: 0.04 K/UL (ref 0–0.2)
BASOPHILS NFR BLD: 0.7 % (ref 0–1.9)
BILIRUB SERPL-MCNC: 0.5 MG/DL (ref 0.1–1)
BUN SERPL-MCNC: 11 MG/DL (ref 6–20)
CALCIUM SERPL-MCNC: 8.3 MG/DL (ref 8.7–10.5)
CHLORIDE SERPL-SCNC: 110 MMOL/L (ref 95–110)
CHOLEST SERPL-MCNC: 162 MG/DL (ref 120–199)
CHOLEST/HDLC SERPL: 2.6 {RATIO} (ref 2–5)
CO2 SERPL-SCNC: 21 MMOL/L (ref 23–29)
CREAT SERPL-MCNC: 0.7 MG/DL (ref 0.5–1.4)
DIFFERENTIAL METHOD: ABNORMAL
EOSINOPHIL # BLD AUTO: 0.5 K/UL (ref 0–0.5)
EOSINOPHIL NFR BLD: 8.3 % (ref 0–8)
ERYTHROCYTE [DISTWIDTH] IN BLOOD BY AUTOMATED COUNT: 12.9 % (ref 11.5–14.5)
EST. GFR  (NO RACE VARIABLE): >60 ML/MIN/1.73 M^2
ESTIMATED AVG GLUCOSE: 100 MG/DL (ref 68–131)
GLUCOSE SERPL-MCNC: 82 MG/DL (ref 70–110)
HBA1C MFR BLD: 5.1 % (ref 4–5.6)
HCT VFR BLD AUTO: 39.7 % (ref 37–48.5)
HDLC SERPL-MCNC: 63 MG/DL (ref 40–75)
HDLC SERPL: 38.9 % (ref 20–50)
HGB BLD-MCNC: 12.3 G/DL (ref 12–16)
IMM GRANULOCYTES # BLD AUTO: 0.01 K/UL (ref 0–0.04)
IMM GRANULOCYTES NFR BLD AUTO: 0.2 % (ref 0–0.5)
LDLC SERPL CALC-MCNC: 87 MG/DL (ref 63–159)
LYMPHOCYTES # BLD AUTO: 1.2 K/UL (ref 1–4.8)
LYMPHOCYTES NFR BLD: 19.9 % (ref 18–48)
MCH RBC QN AUTO: 28.4 PG (ref 27–31)
MCHC RBC AUTO-ENTMCNC: 31 G/DL (ref 32–36)
MCV RBC AUTO: 92 FL (ref 82–98)
MONOCYTES # BLD AUTO: 0.6 K/UL (ref 0.3–1)
MONOCYTES NFR BLD: 10 % (ref 4–15)
NEUTROPHILS # BLD AUTO: 3.7 K/UL (ref 1.8–7.7)
NEUTROPHILS NFR BLD: 60.9 % (ref 38–73)
NONHDLC SERPL-MCNC: 99 MG/DL
NRBC BLD-RTO: 0 /100 WBC
PLATELET # BLD AUTO: 212 K/UL (ref 150–450)
PMV BLD AUTO: 10.6 FL (ref 9.2–12.9)
POTASSIUM SERPL-SCNC: 4.2 MMOL/L (ref 3.5–5.1)
PROT SERPL-MCNC: 6.5 G/DL (ref 6–8.4)
RBC # BLD AUTO: 4.33 M/UL (ref 4–5.4)
SODIUM SERPL-SCNC: 139 MMOL/L (ref 136–145)
T4 FREE SERPL-MCNC: 0.8 NG/DL (ref 0.71–1.51)
TRIGL SERPL-MCNC: 60 MG/DL (ref 30–150)
TSH SERPL DL<=0.005 MIU/L-ACNC: 0.51 UIU/ML (ref 0.4–4)
WBC # BLD AUTO: 6.02 K/UL (ref 3.9–12.7)

## 2022-11-10 PROCEDURE — 84439 ASSAY OF FREE THYROXINE: CPT | Performed by: FAMILY MEDICINE

## 2022-11-10 PROCEDURE — 83036 HEMOGLOBIN GLYCOSYLATED A1C: CPT | Performed by: FAMILY MEDICINE

## 2022-11-10 PROCEDURE — 84443 ASSAY THYROID STIM HORMONE: CPT | Performed by: FAMILY MEDICINE

## 2022-11-10 PROCEDURE — 36415 COLL VENOUS BLD VENIPUNCTURE: CPT | Mod: PO | Performed by: FAMILY MEDICINE

## 2022-11-10 PROCEDURE — 80053 COMPREHEN METABOLIC PANEL: CPT | Performed by: FAMILY MEDICINE

## 2022-11-10 PROCEDURE — 80061 LIPID PANEL: CPT | Performed by: FAMILY MEDICINE

## 2022-11-10 PROCEDURE — 85025 COMPLETE CBC W/AUTO DIFF WBC: CPT | Performed by: FAMILY MEDICINE

## 2022-11-10 NOTE — TELEPHONE ENCOUNTER
Spoke with the Patient to give her the referral for Vascular department phone number  to call to be scheduled.  Patient verbally understands.

## 2022-11-11 DIAGNOSIS — I87.2 VENOUS INSUFFICIENCY: Primary | ICD-10-CM

## 2022-11-25 ENCOUNTER — HOSPITAL ENCOUNTER (OUTPATIENT)
Dept: RADIOLOGY | Facility: HOSPITAL | Age: 37
Discharge: HOME | End: 2022-11-25
Attending: SURGERY
Payer: MEDICAID

## 2022-11-25 DIAGNOSIS — I87.2 VENOUS INSUFFICIENCY: ICD-10-CM

## 2022-11-25 PROCEDURE — 93970 EXTREMITY STUDY: CPT | Mod: 26,,, | Performed by: RADIOLOGY

## 2022-11-25 PROCEDURE — 93970 US LOWER EXTREMITY VEINS BILATERAL INSUFFICIENCY: ICD-10-PCS | Mod: 26,,, | Performed by: RADIOLOGY

## 2022-11-25 PROCEDURE — 93970 EXTREMITY STUDY: CPT | Mod: TC

## 2022-11-28 ENCOUNTER — OFFICE VISIT (OUTPATIENT)
Dept: SURGERY | Facility: CLINIC | Age: 37
End: 2022-11-28
Payer: MEDICAID

## 2022-11-28 ENCOUNTER — ANESTHESIA EVENT (OUTPATIENT)
Dept: SURGERY | Facility: HOSPITAL | Age: 37
End: 2022-11-28
Payer: MEDICAID

## 2022-11-28 ENCOUNTER — OFFICE VISIT (OUTPATIENT)
Dept: VASCULAR SURGERY | Facility: CLINIC | Age: 37
End: 2022-11-28
Payer: MEDICAID

## 2022-11-28 VITALS
BODY MASS INDEX: 20.12 KG/M2 | BODY MASS INDEX: 20.01 KG/M2 | OXYGEN SATURATION: 100 % | WEIGHT: 106.5 LBS | HEART RATE: 70 BPM | SYSTOLIC BLOOD PRESSURE: 88 MMHG | DIASTOLIC BLOOD PRESSURE: 64 MMHG | WEIGHT: 106 LBS | SYSTOLIC BLOOD PRESSURE: 98 MMHG | DIASTOLIC BLOOD PRESSURE: 63 MMHG | HEIGHT: 61 IN

## 2022-11-28 DIAGNOSIS — L72.0 EPIDERMAL INCLUSION CYST: Primary | ICD-10-CM

## 2022-11-28 DIAGNOSIS — R22.41 MASS OF RIGHT THIGH: Primary | ICD-10-CM

## 2022-11-28 DIAGNOSIS — I83.93 SPIDER VEINS OF BOTH LOWER EXTREMITIES: Primary | ICD-10-CM

## 2022-11-28 DIAGNOSIS — I83.93 SPIDER VEINS OF BOTH LOWER EXTREMITIES: ICD-10-CM

## 2022-11-28 PROCEDURE — 3044F PR MOST RECENT HEMOGLOBIN A1C LEVEL <7.0%: ICD-10-PCS | Mod: CPTII,,, | Performed by: SURGERY

## 2022-11-28 PROCEDURE — 3044F HG A1C LEVEL LT 7.0%: CPT | Mod: CPTII,S$GLB,, | Performed by: SURGERY

## 2022-11-28 PROCEDURE — 3074F SYST BP LT 130 MM HG: CPT | Mod: CPTII,S$GLB,, | Performed by: SURGERY

## 2022-11-28 PROCEDURE — 3008F PR BODY MASS INDEX (BMI) DOCUMENTED: ICD-10-PCS | Mod: CPTII,S$GLB,, | Performed by: SURGERY

## 2022-11-28 PROCEDURE — 3044F HG A1C LEVEL LT 7.0%: CPT | Mod: CPTII,,, | Performed by: SURGERY

## 2022-11-28 PROCEDURE — 3044F PR MOST RECENT HEMOGLOBIN A1C LEVEL <7.0%: ICD-10-PCS | Mod: CPTII,S$GLB,, | Performed by: SURGERY

## 2022-11-28 PROCEDURE — 1159F PR MEDICATION LIST DOCUMENTED IN MEDICAL RECORD: ICD-10-PCS | Mod: CPTII,S$GLB,, | Performed by: SURGERY

## 2022-11-28 PROCEDURE — 99999 PR PBB SHADOW E&M-EST. PATIENT-LVL I: ICD-10-PCS | Mod: PBBFAC,,, | Performed by: SURGERY

## 2022-11-28 PROCEDURE — 99204 PR OFFICE/OUTPT VISIT, NEW, LEVL IV, 45-59 MIN: ICD-10-PCS | Mod: S$PBB,,, | Performed by: SURGERY

## 2022-11-28 PROCEDURE — 99999 PR PBB SHADOW E&M-EST. PATIENT-LVL I: CPT | Mod: PBBFAC,,, | Performed by: SURGERY

## 2022-11-28 PROCEDURE — 99204 OFFICE O/P NEW MOD 45 MIN: CPT | Mod: S$GLB,,, | Performed by: SURGERY

## 2022-11-28 PROCEDURE — 3078F DIAST BP <80 MM HG: CPT | Mod: CPTII,S$GLB,, | Performed by: SURGERY

## 2022-11-28 PROCEDURE — 3078F PR MOST RECENT DIASTOLIC BLOOD PRESSURE < 80 MM HG: ICD-10-PCS | Mod: CPTII,,, | Performed by: SURGERY

## 2022-11-28 PROCEDURE — 3008F PR BODY MASS INDEX (BMI) DOCUMENTED: ICD-10-PCS | Mod: CPTII,,, | Performed by: SURGERY

## 2022-11-28 PROCEDURE — 3074F PR MOST RECENT SYSTOLIC BLOOD PRESSURE < 130 MM HG: ICD-10-PCS | Mod: CPTII,,, | Performed by: SURGERY

## 2022-11-28 PROCEDURE — 3078F PR MOST RECENT DIASTOLIC BLOOD PRESSURE < 80 MM HG: ICD-10-PCS | Mod: CPTII,S$GLB,, | Performed by: SURGERY

## 2022-11-28 PROCEDURE — 3074F PR MOST RECENT SYSTOLIC BLOOD PRESSURE < 130 MM HG: ICD-10-PCS | Mod: CPTII,S$GLB,, | Performed by: SURGERY

## 2022-11-28 PROCEDURE — 99211 OFF/OP EST MAY X REQ PHY/QHP: CPT | Mod: PBBFAC | Performed by: SURGERY

## 2022-11-28 PROCEDURE — 3078F DIAST BP <80 MM HG: CPT | Mod: CPTII,,, | Performed by: SURGERY

## 2022-11-28 PROCEDURE — 1159F MED LIST DOCD IN RCRD: CPT | Mod: CPTII,S$GLB,, | Performed by: SURGERY

## 2022-11-28 PROCEDURE — 99204 OFFICE O/P NEW MOD 45 MIN: CPT | Mod: S$PBB,,, | Performed by: SURGERY

## 2022-11-28 PROCEDURE — 3008F BODY MASS INDEX DOCD: CPT | Mod: CPTII,,, | Performed by: SURGERY

## 2022-11-28 PROCEDURE — 3074F SYST BP LT 130 MM HG: CPT | Mod: CPTII,,, | Performed by: SURGERY

## 2022-11-28 PROCEDURE — 3008F BODY MASS INDEX DOCD: CPT | Mod: CPTII,S$GLB,, | Performed by: SURGERY

## 2022-11-28 PROCEDURE — 99204 PR OFFICE/OUTPT VISIT, NEW, LEVL IV, 45-59 MIN: ICD-10-PCS | Mod: S$GLB,,, | Performed by: SURGERY

## 2022-11-28 RX ORDER — MUPIROCIN 20 MG/G
OINTMENT TOPICAL
Status: CANCELLED | OUTPATIENT
Start: 2022-11-28

## 2022-11-28 RX ORDER — SODIUM CHLORIDE 9 MG/ML
INJECTION, SOLUTION INTRAVENOUS CONTINUOUS
Status: CANCELLED | OUTPATIENT
Start: 2022-11-28

## 2022-11-28 NOTE — PROGRESS NOTES
"        Cm Yu MD, RPVI                                 [facility]                           [facility]                             11/28/2022    HPI:  Anne Arenas is a 37 y.o. female with   Patient Active Problem List   Diagnosis    Language barrier- Tongan    History of ectopic pregnancy    Myopic astigmatism, bilateral    being managed by PCP and specialists who is here today for evaluation of R inner bulging "vein".  Patient states location is R thigh occurring for yrs.  Associated signs and symptoms include spider veins.  Quality is dull and severity is 0/10.  Symptoms began yrs ago.  Alleviating factors include elevation.  Worsening factors include dependency.  Patient has been wearing compression stockings for greater than 3 months.  +FH of venous disease.  Symptoms do not limit patient's functional status and daily activities.  no DVT history.  no venous interventions.  no low sodium diet.  no excessive water intake.    Migraine with aura: no  PFO/ASD/right to left shunt: no  Pregnant: no  Breastfeeding: no    no MI  no Stroke  Tobacco use: no    Past Medical History:   Diagnosis Date    Hx of ectopic pregnancy 11/26/13    Ruptured right ectopic pregnancy     Past Surgical History:   Procedure Laterality Date    DILATION AND CURETTAGE OF UTERUS  2008    TAB    DILATION AND CURETTAGE OF UTERUS  2010    TAB    PELVIC LAPAROSCOPY Right 11/26/13    ruptured ectopic pregnancy    SALPINGECTOMY Right 11/26/13    Ruptured Ectopic pregnancy     Family History   Problem Relation Age of Onset    Hypertension Maternal Grandmother     Hyperlipidemia Maternal Grandmother     No Known Problems Paternal Grandfather     No Known Problems Paternal Grandmother     No Known Problems Maternal Grandfather     No Known Problems Father     No Known Problems Mother     No Known Problems Sister     No Known Problems Brother     No Known Problems Maternal Aunt     No Known Problems Maternal " Uncle     No Known Problems Paternal Aunt     No Known Problems Paternal Uncle     Cancer Neg Hx     Amblyopia Neg Hx     Blindness Neg Hx     Cataracts Neg Hx     Diabetes Neg Hx     Glaucoma Neg Hx     Macular degeneration Neg Hx     Retinal detachment Neg Hx     Strabismus Neg Hx     Stroke Neg Hx     Thyroid disease Neg Hx      Social History     Socioeconomic History    Marital status:     Number of children: 0   Occupational History     Employer: [employer]     Comment:    Tobacco Use    Smoking status: Never    Smokeless tobacco: Never   Substance and Sexual Activity    Alcohol use: No    Drug use: No    Sexual activity: Yes     Partners: Male     Birth control/protection: None   Social History Narrative    Together  2009    He is an     She is a beautician     No current outpatient medications on file.    REVIEW OF SYSTEMS:  General: No fevers or chills; ENT: No sore throat; Allergy and Immunology: no persistent infections; Hematological and Lymphatic: No history of bleeding or easy bruising; Endocrine: negative; Respiratory: no cough, shortness of breath, or wheezing; Cardiovascular: no chest pain or dyspnea on exertion; Gastrointestinal: no abdominal pain/back, change in bowel habits, or bloody stools; Genito-Urinary: no dysuria, trouble voiding, or hematuria; Musculoskeletal: no edema; Neurological: no TIA or stroke symptoms; Psychiatric: no nervousness, anxiety or depression.    PHYSICAL EXAM:                General appearance:  Alert, well-appearing, and in no distress.  Oriented to person, place, and time                    Neurological: Normal speech, no focal findings noted; CN II - XII grossly intact. RLE with sensation to light touch, LLE with sensation to light touch.            Musculoskeletal: Digits/nail without cyanosis/clubbing.  Strength 5/5 BLE.                    Neck: Supple, no significant adenopathy                  Chest:  No wheezes, symmetric air  entry. No use of accessory muscles               Cardiac: Normal rate and regular rhythm            Abdomen: Soft, nontender, nondistended      Extremities:   2+ R DP pulse, 2+ L DP pulse      no RLE edema, no LLE edema    Skin:  RLE no ulcer; LLE no ulcer      RLE + spider veins, LLE + spider veins      RLE no varicose veins, R thigh with nodular cystic structure, LLE no varicose veins    CEAP 1/1        LAB RESULTS:  No results found for: CBC  No results found for: LABPROT, INR  Lab Results   Component Value Date     11/10/2022    K 4.2 11/10/2022     11/10/2022    CO2 21 (L) 11/10/2022    GLU 82 11/10/2022    BUN 11 11/10/2022    CREATININE 0.7 11/10/2022    CALCIUM 8.3 (L) 11/10/2022    ANIONGAP 8 11/10/2022    EGFRNONAA >60 05/25/2020     Lab Results   Component Value Date    WBC 6.02 11/10/2022    RBC 4.33 11/10/2022    HGB 12.3 11/10/2022    HCT 39.7 11/10/2022    MCV 92 11/10/2022    MCH 28.4 11/10/2022    MCHC 31.0 (L) 11/10/2022    RDW 12.9 11/10/2022     11/10/2022    MPV 10.6 11/10/2022    GRAN 3.7 11/10/2022    GRAN 60.9 11/10/2022    LYMPH 1.2 11/10/2022    LYMPH 19.9 11/10/2022    MONO 0.6 11/10/2022    MONO 10.0 11/10/2022    EOS 0.5 11/10/2022    BASO 0.04 11/10/2022    EOSINOPHIL 8.3 (H) 11/10/2022    BASOPHIL 0.7 11/10/2022    DIFFMETHOD Automated 11/10/2022     .  Lab Results   Component Value Date    HGBA1C 5.1 11/10/2022       IMAGING:  All pertinent imaging has been reviewed and interpreted independently.    Venous US 11/2022 Impression:  Color flow evaluation of the right lower extremity demonstrates no evidence of venous thrombosis in the deep or superficial veins, and no reflux.  Color flow evaluation of the left lower extremity demonstrates no evidence of venous thrombosis in the deep or superficial veins, and no reflux.    11/2022  EXAMINATION:  US LOWER EXTREMITY VEINS BILATERAL INSUFFICIENCY     CLINICAL HISTORY:  Venous insufficiency (chronic) (peripheral)      TECHNIQUE:  Bilateral lower extremity venous Doppler exam including Valsalva or standing maneuvers for evaluation of venous insufficiency of the superficial systems bilaterally.  Reflux times greater than 500 ms were considered indicative of superficial system reflux.     COMPARISON:  None     FINDINGS:  The common femoral, proximal greater saphenous, femoral, popliteal, peroneal, posterior tibial, and anterior tibial veins were evaluated using a combination of graded compression and augmentation techniques. These veins are noted to be patent. There is no sonographic evidence for deep venous thrombosis within either lower extremity.     The greater saphenous and lesser saphenous veins were evaluated bilaterally for reflux using both Valsalva and standing maneuvers.     The right greater saphenous vein measures up to 9.7 mm in diameter at the saphenofemoral junction.  There is no significant venous reflux identified within the right greater saphenous vein. The right lesser saphenous vein measures 4.6 mm in diameter at the knee.  No significant venous reflux is identified within the right lesser saphenous vein.  The right anterior accessory saphenous vein measures 4.5 mm in diameter within the proximal thigh.  No significant venous reflux is identified within the right anterior accessory saphenous vein.     The left greater saphenous vein measures up to 7.3 mm in diameter at the saphenofemoral junction.  There is no evidence for significant reflux within the left greater saphenous vein. The left lesser saphenous vein measures 6.2 mm in diameter at the proximal calf.  No evidence for significant reflux within the left lesser saphenous vein.  The left anterior accessory saphenous vein measures 4.3 mm in diameter.  No significant venous reflux is identified within the left anterior accessory saphenous vein.     There is a hypoechoic focus within the subcutaneous tissues at the right upper inner thigh measuring 0.8 x  0.3 x 0.9 cm with no associated increased vascularity noted.  The sonographic appearance is nonspecific although an epidermal inclusion cyst would be a consideration.     Impression:     There is no evidence for deep venous thrombosis within either lower extremity.     There are dilated greater saphenous veins identified at the levels of the saphenofemoral junctions bilaterally.  However, no significant venous reflux is elicited within the greater, lesser, or anterior accessory saphenous veins bilaterally.        Electronically signed by: Richardson Guo MD  Date:                                            11/26/2022  Time:                                           09:46      IMP/PLAN:  37 y.o. female with   Patient Active Problem List   Diagnosis    Language barrier- Arabic    History of ectopic pregnancy    Myopic astigmatism, bilateral    being managed by PCP and specialists who is here today for evaluation of R thigh cyst.    -Epidermal inclusion cyst R thigh - rec General Surgery evaluation; I have arranged for her to be seen by Dr. Sanchez today  -recommend compression with Rx stockings, elevation  -Exercise   -Dermatology  Referral for spider vein/telangectasia skin laser therapy  -RTC prn    I spent 13 minutes evaluating this patient and greater than 50% of the time was spent counseling, coordinator care and discussing the plan of care.  All questions were answered and patient stated understanding with agreement with the above treatment plan.    Cm Yu MD [facility]  Vascular and Endovascular Surgery

## 2022-11-28 NOTE — PROGRESS NOTES
History & Physical    SUBJECTIVE:     History of Present Illness:  Patient is a 37 y.o. female presents with mass in her right thigh for 2-3 years increasing in size.     Chief Complaint   Patient presents with    Cyst     Right inner thigh cyst.       Review of patient's allergies indicates:  No Known Allergies    No current outpatient medications on file.     No current facility-administered medications for this visit.       Past Medical History:   Diagnosis Date    Hx of ectopic pregnancy 11/26/13    Ruptured right ectopic pregnancy     Past Surgical History:   Procedure Laterality Date    DILATION AND CURETTAGE OF UTERUS  2008    TAB    DILATION AND CURETTAGE OF UTERUS  2010    TAB    PELVIC LAPAROSCOPY Right 11/26/13    ruptured ectopic pregnancy    SALPINGECTOMY Right 11/26/13    Ruptured Ectopic pregnancy     Family History   Problem Relation Age of Onset    Hypertension Maternal Grandmother     Hyperlipidemia Maternal Grandmother     No Known Problems Paternal Grandfather     No Known Problems Paternal Grandmother     No Known Problems Maternal Grandfather     No Known Problems Father     No Known Problems Mother     No Known Problems Sister     No Known Problems Brother     No Known Problems Maternal Aunt     No Known Problems Maternal Uncle     No Known Problems Paternal Aunt     No Known Problems Paternal Uncle     Cancer Neg Hx     Amblyopia Neg Hx     Blindness Neg Hx     Cataracts Neg Hx     Diabetes Neg Hx     Glaucoma Neg Hx     Macular degeneration Neg Hx     Retinal detachment Neg Hx     Strabismus Neg Hx     Stroke Neg Hx     Thyroid disease Neg Hx      Social History     Tobacco Use    Smoking status: Never    Smokeless tobacco: Never   Substance Use Topics    Alcohol use: No    Drug use: No        Review of Systems:  Review of Systems   Constitutional: Negative.    HENT: Negative.     Eyes: Negative.    Respiratory: Negative.     Cardiovascular: Negative.    Gastrointestinal: Negative.   "  Endocrine: Negative.    Musculoskeletal: Negative.    Skin: Negative.    Allergic/Immunologic: Negative.    Neurological: Negative.    Hematological: Negative.    Psychiatric/Behavioral: Negative.     All other systems reviewed and are negative.    OBJECTIVE:     Vital Signs (Most Recent)  Pulse: 70 (11/28/22 0956)  BP: 98/63 (11/28/22 0956)  SpO2: 100 % (11/28/22 0956)  5' 1" (1.549 m)  48.1 kg (106 lb)     Physical Exam:  Physical Exam  Vitals reviewed.   Constitutional:       Appearance: She is well-developed.   HENT:      Head: Normocephalic and atraumatic.      Right Ear: External ear normal.      Left Ear: External ear normal.      Nose: Nose normal.   Eyes:      Conjunctiva/sclera: Conjunctivae normal.      Pupils: Pupils are equal, round, and reactive to light.   Cardiovascular:      Rate and Rhythm: Normal rate and regular rhythm.      Heart sounds: Normal heart sounds.   Pulmonary:      Effort: Pulmonary effort is normal.      Breath sounds: Normal breath sounds.   Abdominal:      General: Bowel sounds are normal.      Palpations: Abdomen is soft.   Genitourinary:      Musculoskeletal:         General: Normal range of motion.      Cervical back: Normal range of motion and neck supple.   Skin:     General: Skin is warm and dry.   Neurological:      Mental Status: She is alert and oriented to person, place, and time.      Deep Tendon Reflexes: Reflexes are normal and symmetric.   Psychiatric:         Behavior: Behavior normal.         Thought Content: Thought content normal.       Laboratory  none    Diagnostic Results:  none    ASSESSMENT/PLAN:     Right thigh mass/cyst    PLAN:Plan     I discussed the mass and the thoughts on management.  I will take her to the OR for excision with the risks explained         "

## 2022-12-02 ENCOUNTER — HOSPITAL ENCOUNTER (OUTPATIENT)
Dept: PREADMISSION TESTING | Facility: HOSPITAL | Age: 37
Discharge: HOME | End: 2022-12-02
Attending: SURGERY
Payer: MEDICAID

## 2022-12-02 VITALS
SYSTOLIC BLOOD PRESSURE: 102 MMHG | HEIGHT: 61 IN | OXYGEN SATURATION: 100 % | DIASTOLIC BLOOD PRESSURE: 62 MMHG | RESPIRATION RATE: 18 BRPM | HEART RATE: 80 BPM | BODY MASS INDEX: 19.94 KG/M2 | TEMPERATURE: 98 F | WEIGHT: 105.63 LBS

## 2022-12-02 DIAGNOSIS — Z01.818 PREOP TESTING: Primary | ICD-10-CM

## 2022-12-02 DIAGNOSIS — R22.41 MASS OF RIGHT THIGH: ICD-10-CM

## 2022-12-02 LAB
ALBUMIN SERPL BCP-MCNC: 3.9 G/DL (ref 3.5–5.2)
ALP SERPL-CCNC: 29 U/L (ref 55–135)
ALT SERPL W/O P-5'-P-CCNC: 10 U/L (ref 10–44)
ANION GAP SERPL CALC-SCNC: 9 MMOL/L (ref 8–16)
AST SERPL-CCNC: 16 U/L (ref 10–40)
BILIRUB SERPL-MCNC: 0.6 MG/DL (ref 0.1–1)
BUN SERPL-MCNC: 19 MG/DL (ref 6–20)
CALCIUM SERPL-MCNC: 8.9 MG/DL (ref 8.7–10.5)
CHLORIDE SERPL-SCNC: 107 MMOL/L (ref 95–110)
CO2 SERPL-SCNC: 21 MMOL/L (ref 23–29)
CREAT SERPL-MCNC: 0.7 MG/DL (ref 0.5–1.4)
EST. GFR  (NO RACE VARIABLE): >60 ML/MIN/1.73 M^2
GLUCOSE SERPL-MCNC: 157 MG/DL (ref 70–110)
POTASSIUM SERPL-SCNC: 4.1 MMOL/L (ref 3.5–5.1)
PROT SERPL-MCNC: 7.2 G/DL (ref 6–8.4)
SODIUM SERPL-SCNC: 137 MMOL/L (ref 136–145)

## 2022-12-02 PROCEDURE — 36415 COLL VENOUS BLD VENIPUNCTURE: CPT | Performed by: SURGERY

## 2022-12-02 PROCEDURE — 80053 COMPREHEN METABOLIC PANEL: CPT | Performed by: SURGERY

## 2022-12-02 NOTE — DISCHARGE INSTRUCTIONS

## 2022-12-06 ENCOUNTER — HOSPITAL ENCOUNTER (OUTPATIENT)
Facility: HOSPITAL | Age: 37
Discharge: HOME | End: 2022-12-06
Attending: SURGERY | Admitting: SURGERY
Payer: MEDICAID

## 2022-12-06 ENCOUNTER — ANESTHESIA (OUTPATIENT)
Dept: SURGERY | Facility: HOSPITAL | Age: 37
End: 2022-12-06
Payer: MEDICAID

## 2022-12-06 VITALS
BODY MASS INDEX: 19.95 KG/M2 | TEMPERATURE: 98 F | RESPIRATION RATE: 16 BRPM | OXYGEN SATURATION: 100 % | SYSTOLIC BLOOD PRESSURE: 105 MMHG | DIASTOLIC BLOOD PRESSURE: 60 MMHG | HEART RATE: 63 BPM | WEIGHT: 105.56 LBS

## 2022-12-06 DIAGNOSIS — R22.41 MASS OF RIGHT THIGH: Primary | ICD-10-CM

## 2022-12-06 DIAGNOSIS — Z01.818 PREOPERATIVE TESTING: ICD-10-CM

## 2022-12-06 LAB
B-HCG UR QL: NEGATIVE
POCT GLUCOSE: 94 MG/DL (ref 70–110)

## 2022-12-06 PROCEDURE — 36000706: Performed by: SURGERY

## 2022-12-06 PROCEDURE — 37000009 HC ANESTHESIA EA ADD 15 MINS: Performed by: SURGERY

## 2022-12-06 PROCEDURE — 00400 ANES INTEGUMENTARY SYS NOS: CPT | Performed by: SURGERY

## 2022-12-06 PROCEDURE — 88307 PR  SURG PATH,LEVEL V: ICD-10-PCS | Mod: 26,,, | Performed by: STUDENT IN AN ORGANIZED HEALTH CARE EDUCATION/TRAINING PROGRAM

## 2022-12-06 PROCEDURE — 63600175 PHARM REV CODE 636 W HCPCS: Performed by: ANESTHESIOLOGY

## 2022-12-06 PROCEDURE — 81025 URINE PREGNANCY TEST: CPT | Performed by: SURGERY

## 2022-12-06 PROCEDURE — 25000003 PHARM REV CODE 250: Performed by: SURGERY

## 2022-12-06 PROCEDURE — D9220A PRA ANESTHESIA: Mod: ANES,,, | Performed by: ANESTHESIOLOGY

## 2022-12-06 PROCEDURE — 63600175 PHARM REV CODE 636 W HCPCS: Performed by: STUDENT IN AN ORGANIZED HEALTH CARE EDUCATION/TRAINING PROGRAM

## 2022-12-06 PROCEDURE — 88307 TISSUE EXAM BY PATHOLOGIST: CPT | Mod: 26,,, | Performed by: STUDENT IN AN ORGANIZED HEALTH CARE EDUCATION/TRAINING PROGRAM

## 2022-12-06 PROCEDURE — 63600175 PHARM REV CODE 636 W HCPCS: Performed by: SURGERY

## 2022-12-06 PROCEDURE — 11401 EXC TR-EXT B9+MARG 0.6-1 CM: CPT | Mod: ,,, | Performed by: SURGERY

## 2022-12-06 PROCEDURE — 25000003 PHARM REV CODE 250: Performed by: ANESTHESIOLOGY

## 2022-12-06 PROCEDURE — 88307 TISSUE EXAM BY PATHOLOGIST: CPT | Performed by: STUDENT IN AN ORGANIZED HEALTH CARE EDUCATION/TRAINING PROGRAM

## 2022-12-06 PROCEDURE — D9220A PRA ANESTHESIA: Mod: CRNA,,, | Performed by: STUDENT IN AN ORGANIZED HEALTH CARE EDUCATION/TRAINING PROGRAM

## 2022-12-06 PROCEDURE — 25000003 PHARM REV CODE 250: Performed by: STUDENT IN AN ORGANIZED HEALTH CARE EDUCATION/TRAINING PROGRAM

## 2022-12-06 PROCEDURE — D9220A PRA ANESTHESIA: ICD-10-PCS | Mod: CRNA,,, | Performed by: STUDENT IN AN ORGANIZED HEALTH CARE EDUCATION/TRAINING PROGRAM

## 2022-12-06 PROCEDURE — 12031 PR LAYR CLOS WND TRUNK,ARM,LEG <2.5 CM: ICD-10-PCS | Mod: 51,,, | Performed by: SURGERY

## 2022-12-06 PROCEDURE — 71000015 HC POSTOP RECOV 1ST HR: Performed by: SURGERY

## 2022-12-06 PROCEDURE — 37000008 HC ANESTHESIA 1ST 15 MINUTES: Performed by: SURGERY

## 2022-12-06 PROCEDURE — 36000707: Performed by: SURGERY

## 2022-12-06 PROCEDURE — 12031 INTMD RPR S/A/T/EXT 2.5 CM/<: CPT | Mod: 51,,, | Performed by: SURGERY

## 2022-12-06 PROCEDURE — 11401 PR EXC SKIN BENIG 0.6-1 CM TRUNK,ARM,LEG: ICD-10-PCS | Mod: ,,, | Performed by: SURGERY

## 2022-12-06 PROCEDURE — 82962 GLUCOSE BLOOD TEST: CPT | Performed by: SURGERY

## 2022-12-06 PROCEDURE — D9220A PRA ANESTHESIA: ICD-10-PCS | Mod: ANES,,, | Performed by: ANESTHESIOLOGY

## 2022-12-06 PROCEDURE — 71000016 HC POSTOP RECOV ADDL HR: Performed by: SURGERY

## 2022-12-06 RX ORDER — SODIUM CHLORIDE, SODIUM LACTATE, POTASSIUM CHLORIDE, CALCIUM CHLORIDE 600; 310; 30; 20 MG/100ML; MG/100ML; MG/100ML; MG/100ML
INJECTION, SOLUTION INTRAVENOUS CONTINUOUS
Status: DISCONTINUED | OUTPATIENT
Start: 2022-12-06 | End: 2022-12-06 | Stop reason: HOSPADM

## 2022-12-06 RX ORDER — SODIUM CHLORIDE 9 MG/ML
INJECTION, SOLUTION INTRAVENOUS CONTINUOUS
Status: DISCONTINUED | OUTPATIENT
Start: 2022-12-06 | End: 2022-12-06 | Stop reason: HOSPADM

## 2022-12-06 RX ORDER — LIDOCAINE HYDROCHLORIDE 20 MG/ML
INJECTION INTRAVENOUS
Status: DISCONTINUED | OUTPATIENT
Start: 2022-12-06 | End: 2022-12-06

## 2022-12-06 RX ORDER — PROPOFOL 10 MG/ML
VIAL (ML) INTRAVENOUS
Status: DISCONTINUED | OUTPATIENT
Start: 2022-12-06 | End: 2022-12-06

## 2022-12-06 RX ORDER — LIDOCAINE HYDROCHLORIDE 10 MG/ML
1 INJECTION, SOLUTION EPIDURAL; INFILTRATION; INTRACAUDAL; PERINEURAL ONCE
Status: DISCONTINUED | OUTPATIENT
Start: 2022-12-06 | End: 2022-12-06 | Stop reason: HOSPADM

## 2022-12-06 RX ORDER — ACETAMINOPHEN 500 MG
1000 TABLET ORAL
Status: COMPLETED | OUTPATIENT
Start: 2022-12-06 | End: 2022-12-06

## 2022-12-06 RX ORDER — HYDROCODONE BITARTRATE AND ACETAMINOPHEN 5; 325 MG/1; MG/1
1 TABLET ORAL EVERY 6 HOURS PRN
Qty: 30 TABLET | Refills: 0 | Status: SHIPPED | OUTPATIENT
Start: 2022-12-06

## 2022-12-06 RX ORDER — MUPIROCIN 20 MG/G
OINTMENT TOPICAL
Status: DISCONTINUED | OUTPATIENT
Start: 2022-12-06 | End: 2022-12-06 | Stop reason: HOSPADM

## 2022-12-06 RX ORDER — FENTANYL CITRATE 50 UG/ML
INJECTION, SOLUTION INTRAMUSCULAR; INTRAVENOUS
Status: DISCONTINUED | OUTPATIENT
Start: 2022-12-06 | End: 2022-12-06

## 2022-12-06 RX ORDER — LIDOCAINE HYDROCHLORIDE 10 MG/ML
INJECTION INFILTRATION; PERINEURAL
Status: DISCONTINUED | OUTPATIENT
Start: 2022-12-06 | End: 2022-12-06 | Stop reason: HOSPADM

## 2022-12-06 RX ORDER — ONDANSETRON 2 MG/ML
INJECTION INTRAMUSCULAR; INTRAVENOUS
Status: DISCONTINUED | OUTPATIENT
Start: 2022-12-06 | End: 2022-12-06

## 2022-12-06 RX ORDER — BUPIVACAINE HYDROCHLORIDE 2.5 MG/ML
INJECTION, SOLUTION INFILTRATION; PERINEURAL
Status: DISCONTINUED | OUTPATIENT
Start: 2022-12-06 | End: 2022-12-06 | Stop reason: HOSPADM

## 2022-12-06 RX ORDER — CEFAZOLIN SODIUM 2 G/50ML
2 SOLUTION INTRAVENOUS
Status: COMPLETED | OUTPATIENT
Start: 2022-12-06 | End: 2022-12-06

## 2022-12-06 RX ORDER — PHENYLEPHRINE HYDROCHLORIDE 10 MG/ML
INJECTION INTRAVENOUS
Status: DISCONTINUED | OUTPATIENT
Start: 2022-12-06 | End: 2022-12-06

## 2022-12-06 RX ORDER — DEXAMETHASONE SODIUM PHOSPHATE 4 MG/ML
INJECTION, SOLUTION INTRA-ARTICULAR; INTRALESIONAL; INTRAMUSCULAR; INTRAVENOUS; SOFT TISSUE
Status: DISCONTINUED | OUTPATIENT
Start: 2022-12-06 | End: 2022-12-06

## 2022-12-06 RX ORDER — ACETAMINOPHEN 10 MG/ML
1000 INJECTION, SOLUTION INTRAVENOUS ONCE
Status: DISCONTINUED | OUTPATIENT
Start: 2022-12-06 | End: 2022-12-06 | Stop reason: HOSPADM

## 2022-12-06 RX ORDER — MORPHINE SULFATE 4 MG/ML
3 INJECTION, SOLUTION INTRAMUSCULAR; INTRAVENOUS
Status: DISCONTINUED | OUTPATIENT
Start: 2022-12-06 | End: 2022-12-06 | Stop reason: HOSPADM

## 2022-12-06 RX ORDER — PROPOFOL 10 MG/ML
VIAL (ML) INTRAVENOUS CONTINUOUS PRN
Status: DISCONTINUED | OUTPATIENT
Start: 2022-12-06 | End: 2022-12-06

## 2022-12-06 RX ORDER — MIDAZOLAM HYDROCHLORIDE 1 MG/ML
INJECTION, SOLUTION INTRAMUSCULAR; INTRAVENOUS
Status: DISCONTINUED | OUTPATIENT
Start: 2022-12-06 | End: 2022-12-06

## 2022-12-06 RX ADMIN — MUPIROCIN: 20 OINTMENT TOPICAL at 08:12

## 2022-12-06 RX ADMIN — ONDANSETRON 4 MG: 2 INJECTION, SOLUTION INTRAMUSCULAR; INTRAVENOUS at 09:12

## 2022-12-06 RX ADMIN — ACETAMINOPHEN 1000 MG: 500 TABLET ORAL at 08:12

## 2022-12-06 RX ADMIN — FENTANYL CITRATE 25 MCG: 50 INJECTION, SOLUTION INTRAMUSCULAR; INTRAVENOUS at 09:12

## 2022-12-06 RX ADMIN — CEFAZOLIN SODIUM 2 G: 2 SOLUTION INTRAVENOUS at 09:12

## 2022-12-06 RX ADMIN — PROPOFOL 30 MG: 10 INJECTION, EMULSION INTRAVENOUS at 09:12

## 2022-12-06 RX ADMIN — PROPOFOL 50 MCG/KG/MIN: 10 INJECTION, EMULSION INTRAVENOUS at 09:12

## 2022-12-06 RX ADMIN — SODIUM CHLORIDE, SODIUM LACTATE, POTASSIUM CHLORIDE, AND CALCIUM CHLORIDE: .6; .31; .03; .02 INJECTION, SOLUTION INTRAVENOUS at 09:12

## 2022-12-06 RX ADMIN — PHENYLEPHRINE HYDROCHLORIDE 150 MCG: 10 INJECTION INTRAVENOUS at 09:12

## 2022-12-06 RX ADMIN — LIDOCAINE HYDROCHLORIDE 40 MG: 20 INJECTION, SOLUTION INTRAVENOUS at 09:12

## 2022-12-06 RX ADMIN — SODIUM CHLORIDE, SODIUM LACTATE, POTASSIUM CHLORIDE, AND CALCIUM CHLORIDE: .6; .31; .03; .02 INJECTION, SOLUTION INTRAVENOUS at 08:12

## 2022-12-06 RX ADMIN — PHENYLEPHRINE HYDROCHLORIDE 100 MCG: 10 INJECTION INTRAVENOUS at 09:12

## 2022-12-06 RX ADMIN — MIDAZOLAM HYDROCHLORIDE 2 MG: 1 INJECTION, SOLUTION INTRAMUSCULAR; INTRAVENOUS at 09:12

## 2022-12-06 RX ADMIN — PROPOFOL 20 MG: 10 INJECTION, EMULSION INTRAVENOUS at 09:12

## 2022-12-06 RX ADMIN — DEXAMETHASONE SODIUM PHOSPHATE 4 MG: 4 INJECTION, SOLUTION INTRAMUSCULAR; INTRAVENOUS at 09:12

## 2022-12-06 RX ADMIN — GLYCOPYRROLATE 0.2 MG: 0.2 INJECTION, SOLUTION INTRAMUSCULAR; INTRAVITREAL at 10:12

## 2022-12-06 RX ADMIN — PHENYLEPHRINE HYDROCHLORIDE 100 MCG: 10 INJECTION INTRAVENOUS at 10:12

## 2022-12-06 NOTE — OP NOTE
[facility] [place]  Operative Note    SUMMARY     Date of Procedure: 12/6/2022     Procedure: Procedure(s) (LRB):  EXCISION, MASS, LOWER EXTREMITY-THIGH (Right)       Surgeon(s) and Role:     * Anibal Laboy MD - Primary    Assisting Surgeon: Ponce Nguyen MD    Pre-Operative Diagnosis: Mass of right thigh [R22.41]    Post-Operative Diagnosis: Post-Op Diagnosis Codes:     * Mass of right thigh [R22.41]    Anesthesia: Local MAC    Operative Findings (including complications, if any):  1 cm skin lesion    Description of Technical Procedures:  Patient was taken to the operating room placed on operating table supine position.  She was placed in frog-leg position and our right buttock/thigh was then prepped and draped in usual sterile fashion.  A time-out was taken surgical checklist was discussed.  An ellipse of tissue was taken around the area of concern after the skin subcutaneous tissue was infiltrated with 1% lidocaine 0.25% Marcaine 50 50 solution.  It is deepened down take totally excised the entire process was sent to pathology for analysis.  Using a 2-1/2 cm incision was then closed in layers with absorbable suture.  Dermabond tape was used she was awakened transported to recovery room satisfactory condition.    Significant Surgical Tasks Conducted by the Assistant(s), if Applicable:  Greater than 50%    Estimated Blood Loss (EBL): 5 mL           Implants: * No implants in log *    Specimens:   Specimen (24h ago, onward)       Start     Ordered    12/06/22 1009  Specimen to Pathology, Surgery General Surgery  Once        Comments: Pre-op Diagnosis: Mass of right thigh [R22.41]Procedure(s):EXCISION, MASS, LOWER EXTREMITY-THIGH Number of specimens: 1Name of specimens: Right thigh mass     References:    Click here for ordering Quick Tip   Question Answer Comment   Procedure Type: General Surgery    Which provider would you like to cc? ANIBAL LABOY    Release to patient Immediate         12/06/22 1009                            Condition: Good    Disposition: PACU - hemodynamically stable.    Attestation: I was present and scrubbed for the entire procedure.

## 2022-12-06 NOTE — PLAN OF CARE
Pre-op plan of care reviewed with patient. Admit assessment complete. Questions encouraged and answered. Pt verbalized an understanding that she can not drive home and states her spouse will pick her up after surgery. Pt ready to proceed.

## 2022-12-06 NOTE — DISCHARGE INSTRUCTIONS
[facility]  Office # [phone]    Discharge Instructions for Same Day Surgery     Call the office for and appointment if one has not already been made.     Diet: Drink plenty of fluids the first 48 hours and you may resume your   usual diet.     Activity: No heavy lifting (over 10 pounds), pushing or pulling until your   post op visit. Your doctor's office may have told you to limit your lifting to less weight, or even no weight.  Be sure to follow those instructions.    Note: You may ride in a car and you may drive when comfortable.     Do not drive, drink alcohol, or sign legal documents for 24 hours, or if taking narcotic pain medication.    Dressings:  Dermabond / Prineotape  or a material like it was used on your incision.   It is like a liquid glue.   Do not peel or try to remove it it will start to fall off in 7-10 days on its' own.   It is OK to shower, pat dry, do not apply any creams or lotions.    No tub baths, swimming pools, hot tubs or submersion of the incision until your surgeon says it's ok.     Medical: Call the doctor for any of the following problems: fever above 101,   severe pain, bleeding, or abdominal distention (swelling).   If constipated you may take any stool softener you choose.     Occasionally small areas of skin numbness or an unpleasant skin sensation can result. Also, you may find that your incision is swollen and tender for a few days.  Some redness around sutures and staples is a normal reaction, but if the discomfort persists or worsens, call you doctor.             Fall Prevention  Millions of people fall every year and injure themselves. You may have had anesthesia or sedation which may increase your risk of falling. You may have health issues that put you at an increased risk of falling.     Here are ways to reduce your risk of falling.    Make your home safe by keeping walkways clear of objects you may trip over.  Use non-slip pads under rugs. Do not  use area rugs or small throw rugs.  Use non-slip mats in bathtubs and showers.  Install handrails and lights on staircases.  Do not walk in poorly lit areas.  Do not stand on chairs or wobbly ladders.  Use caution when reaching overhead or looking upward. This position can cause a loss of balance.  Be sure your shoes fit properly, have non-slip bottoms and are in good condition.   Wear shoes both inside and out. Avoid going barefoot or wearing slippers.  Be cautious when going up and down stairs, curbs, and when walking on uneven sidewalks.  If your balance is poor, consider using a cane or walker.  If your fall was related to alcohol use, stop or limit alcohol intake.   If your fall was related to use of sleeping medicines, talk to your doctor about this. You may need to reduce your dosage at bedtime if you awaken during the night to go to the bathroom.    To reduce the need for nighttime bathroom trips:  Avoid drinking fluids for several hours before going to bed  Empty your bladder before going to bed  Men can keep a urinal at the bedside  Stay as active as you can. Balance, flexibility, strength, and endurance all come from exercise. They all play a role in preventing falls. Ask your healthcare provider which types of activity are right for you.  Get your vision checked on a regular basis.  If you have pets, know where they are before you stand up or walk so you don't trip over them.  Use night lights.

## 2022-12-06 NOTE — TRANSFER OF CARE
Anesthesia Transfer of Care Note    Patient: Anne Arenas    Procedure(s) Performed: Procedure(s) (LRB):  EXCISION, MASS, LOWER EXTREMITY-THIGH (Right)    Patient location: [facility]    Anesthesia Type: MAC    Transport from OR: Transported from OR on room air with adequate spontaneous ventilation    Post pain: adequate analgesia    Post assessment: no apparent anesthetic complications    Post vital signs: stable    Level of consciousness: awake and alert    Nausea/Vomiting: no nausea/vomiting    Complications: none    Transfer of care protocol was followed      Last vitals:   Visit Vitals  BP (!) 98/56   Pulse 80   Temp 36.4 °C (97.6 °F)   Resp 18   Wt 47.9 kg (105 lb 9 oz)   LMP 11/15/2022 (Exact Date)   SpO2 100%   Breastfeeding No   BMI 19.95 kg/m²

## 2022-12-06 NOTE — BRIEF OP NOTE
[facility] [place] - Surgery  Brief Operative Note    Surgery Date: 12/6/2022     Surgeon(s) and Role:     * Anibal Laboy MD - Primary    Assisting Surgeon: Ponce Nguyen MD     Pre-op Diagnosis:  Mass of right thigh [R22.41]    Post-op Diagnosis:  Post-Op Diagnosis Codes:     * Mass of right thigh [R22.41]    Procedure(s) (LRB):  EXCISION, MASS, LOWER EXTREMITY-THIGH (Right)    Anesthesia: Local MAC    Operative Findings: 1.cm mass    Estimated Blood Loss: 5 mL         Specimens:   Specimen (24h ago, onward)       Start     Ordered    12/06/22 1009  Specimen to Pathology, Surgery General Surgery  Once        Comments: Pre-op Diagnosis: Mass of right thigh [R22.41]Procedure(s):EXCISION, MASS, LOWER EXTREMITY-THIGH Number of specimens: 1Name of specimens: Right thigh mass     References:    Click here for ordering Quick Tip   Question Answer Comment   Procedure Type: General Surgery    Which provider would you like to cc? ANIBAL LABOY    Release to patient Immediate        12/06/22 1009                      Discharge Note    OUTCOME: Patient tolerated treatment/procedure well without complication and is now ready for discharge.    DISPOSITION: Home or Self Care    FINAL DIAGNOSIS:  Mass of right thigh    FOLLOWUP: In clinic    DISCHARGE INSTRUCTIONS:    Discharge Procedure Orders   Diet general     Remove dressing in 24 hours     Call MD for:  temperature >100.4     Call MD for:  persistent nausea and vomiting     Call MD for:  severe uncontrolled pain     Call MD for:  difficulty breathing, headache or visual disturbances     Call MD for:  redness, tenderness, or signs of infection (pain, swelling, redness, odor or green/yellow discharge around incision site)     Call MD for:  hives     Shower on day dressing removed (No bath)

## 2022-12-13 ENCOUNTER — OFFICE VISIT (OUTPATIENT)
Dept: SURGERY | Facility: CLINIC | Age: 37
End: 2022-12-13
Payer: MEDICAID

## 2022-12-13 VITALS
HEIGHT: 61 IN | DIASTOLIC BLOOD PRESSURE: 69 MMHG | BODY MASS INDEX: 19.86 KG/M2 | OXYGEN SATURATION: 100 % | HEART RATE: 73 BPM | WEIGHT: 105.19 LBS | SYSTOLIC BLOOD PRESSURE: 99 MMHG

## 2022-12-13 DIAGNOSIS — R22.41 MASS OF RIGHT THIGH: Primary | ICD-10-CM

## 2022-12-13 DIAGNOSIS — I78.1 SPIDER VEINS: Primary | ICD-10-CM

## 2022-12-13 LAB
FINAL PATHOLOGIC DIAGNOSIS: NORMAL
Lab: NORMAL

## 2022-12-13 PROCEDURE — 1159F MED LIST DOCD IN RCRD: CPT | Mod: CPTII,S$GLB,, | Performed by: SURGERY

## 2022-12-13 PROCEDURE — 3078F PR MOST RECENT DIASTOLIC BLOOD PRESSURE < 80 MM HG: ICD-10-PCS | Mod: CPTII,S$GLB,, | Performed by: SURGERY

## 2022-12-13 PROCEDURE — 3078F DIAST BP <80 MM HG: CPT | Mod: CPTII,S$GLB,, | Performed by: SURGERY

## 2022-12-13 PROCEDURE — 3008F PR BODY MASS INDEX (BMI) DOCUMENTED: ICD-10-PCS | Mod: CPTII,S$GLB,, | Performed by: SURGERY

## 2022-12-13 PROCEDURE — 3044F HG A1C LEVEL LT 7.0%: CPT | Mod: CPTII,S$GLB,, | Performed by: SURGERY

## 2022-12-13 PROCEDURE — 99024 POSTOP FOLLOW-UP VISIT: CPT | Mod: S$GLB,,, | Performed by: SURGERY

## 2022-12-13 PROCEDURE — 99024 PR POST-OP FOLLOW-UP VISIT: ICD-10-PCS | Mod: S$GLB,,, | Performed by: SURGERY

## 2022-12-13 PROCEDURE — 1159F PR MEDICATION LIST DOCUMENTED IN MEDICAL RECORD: ICD-10-PCS | Mod: CPTII,S$GLB,, | Performed by: SURGERY

## 2022-12-13 PROCEDURE — 3074F PR MOST RECENT SYSTOLIC BLOOD PRESSURE < 130 MM HG: ICD-10-PCS | Mod: CPTII,S$GLB,, | Performed by: SURGERY

## 2022-12-13 PROCEDURE — 3008F BODY MASS INDEX DOCD: CPT | Mod: CPTII,S$GLB,, | Performed by: SURGERY

## 2022-12-13 PROCEDURE — 3074F SYST BP LT 130 MM HG: CPT | Mod: CPTII,S$GLB,, | Performed by: SURGERY

## 2022-12-13 PROCEDURE — 3044F PR MOST RECENT HEMOGLOBIN A1C LEVEL <7.0%: ICD-10-PCS | Mod: CPTII,S$GLB,, | Performed by: SURGERY

## 2022-12-13 NOTE — PROGRESS NOTES
Subjective:       Patient ID: Anne Arenas is a 37 y.o. female.    Chief Complaint: Post-op Evaluation (Post op for Mass of right thigh )    HPI 38 yo s/p excision of thigh mass that resulted as a lipoma without complaints  Review of Systems      Objective:      Physical Exam  Vitals reviewed.   Constitutional:       Appearance: She is well-developed.   HENT:      Head: Normocephalic and atraumatic.      Right Ear: External ear normal.      Left Ear: External ear normal.      Nose: Nose normal.   Eyes:      Conjunctiva/sclera: Conjunctivae normal.      Pupils: Pupils are equal, round, and reactive to light.   Cardiovascular:      Rate and Rhythm: Normal rate and regular rhythm.      Heart sounds: Normal heart sounds.   Pulmonary:      Effort: Pulmonary effort is normal.      Breath sounds: Normal breath sounds.   Abdominal:      General: Bowel sounds are normal.      Palpations: Abdomen is soft.   Musculoskeletal:         General: Normal range of motion.      Cervical back: Normal range of motion and neck supple.        Legs:    Skin:     General: Skin is warm and dry.   Neurological:      Mental Status: She is alert and oriented to person, place, and time.      Deep Tendon Reflexes: Reflexes are normal and symmetric.   Psychiatric:         Behavior: Behavior normal.         Thought Content: Thought content normal.       Assessment:       Problem List Items Addressed This Visit       Mass of right thigh - Primary       Doing well post op  Plan:       I discussed the path and that I will send her to derm for her spider veins

## 2022-12-19 NOTE — ANESTHESIA PREPROCEDURE EVALUATION
12/19/2022  Anne Arenas is a 37 y.o., female.      Pre-op Assessment     I have reviewed the Nursing Notes.       Review of Systems  Anesthesia Hx:  No problems with previous Anesthesia    Social:  Non-Smoker    Cardiovascular:  Cardiovascular Normal Exercise tolerance: good     Pulmonary:  Pulmonary Normal    Renal/:  Renal/ Normal     Hepatic/GI:  Hepatic/GI Normal    Neurological:  Neurology Normal    Endocrine:  Endocrine Normal        Physical Exam  General: Well nourished, Cooperative, Alert and Oriented    Airway:  Mallampati: III   Mouth Opening: Normal  TM Distance: Normal  Tongue: Normal  Neck ROM: Normal ROM    Dental:  Intact        Anesthesia Plan  Type of Anesthesia, risks & benefits discussed:    Anesthesia Type: MAC  Intra-op Monitoring Plan: Standard ASA Monitors  Post Op Pain Control Plan: multimodal analgesia  Induction:  IV  Informed Consent: Informed consent signed with the Patient and all parties understand the risks and agree with anesthesia plan.  All questions answered.   ASA Score: 1  Day of Surgery Review of History & Physical: H&P Update referred to the surgeon/provider.    Ready For Surgery From Anesthesia Perspective.     .

## 2022-12-19 NOTE — ANESTHESIA POSTPROCEDURE EVALUATION
Anesthesia Post Evaluation    Patient: Anne Arenas    Procedure(s) Performed: Procedure(s) (LRB):  EXCISION, MASS, LOWER EXTREMITY-THIGH (Right)    Final Anesthesia Type: MAC      Patient location during evaluation: [facility]  Patient participation: Yes- Able to Participate  Level of consciousness: awake and alert  Post-procedure vital signs: reviewed and stable  Pain management: adequate  Airway patency: patent    PONV status at discharge: No PONV  Anesthetic complications: no      Cardiovascular status: blood pressure returned to baseline and hemodynamically stable  Respiratory status: unassisted and spontaneous ventilation  Hydration status: euvolemic  Follow-up not needed.          Vitals Value Taken Time   /60 12/06/22 1156   Temp 36.6 °C (97.9 °F) 12/06/22 1156   Pulse 63 12/06/22 1156   Resp 16 12/06/22 1156   SpO2 100 % 12/06/22 1156         No case tracking events are documented in the log.      Pain/Rizwan Score: No data recorded

## 2024-05-03 ENCOUNTER — OFFICE VISIT (OUTPATIENT)
Dept: FAMILY MEDICINE | Facility: CLINIC | Age: 39
End: 2024-05-03
Payer: MEDICAID

## 2024-05-03 ENCOUNTER — LAB VISIT (OUTPATIENT)
Dept: LAB | Facility: HOSPITAL | Age: 39
End: 2024-05-03
Payer: MEDICAID

## 2024-05-03 VITALS
BODY MASS INDEX: 20.21 KG/M2 | OXYGEN SATURATION: 100 % | HEIGHT: 61 IN | TEMPERATURE: 98 F | SYSTOLIC BLOOD PRESSURE: 90 MMHG | DIASTOLIC BLOOD PRESSURE: 64 MMHG | HEART RATE: 62 BPM | WEIGHT: 107.06 LBS

## 2024-05-03 DIAGNOSIS — Z01.82 ENCOUNTER FOR ALLERGY TESTING: ICD-10-CM

## 2024-05-03 DIAGNOSIS — Z00.00 ROUTINE PHYSICAL EXAMINATION: ICD-10-CM

## 2024-05-03 DIAGNOSIS — Z00.00 ROUTINE PHYSICAL EXAMINATION: Primary | ICD-10-CM

## 2024-05-03 LAB
ALBUMIN SERPL BCP-MCNC: 3.7 G/DL (ref 3.5–5.2)
ALP SERPL-CCNC: 31 U/L (ref 55–135)
ALT SERPL W/O P-5'-P-CCNC: 14 U/L (ref 10–44)
ANION GAP SERPL CALC-SCNC: 5 MMOL/L (ref 8–16)
AST SERPL-CCNC: 18 U/L (ref 10–40)
BASOPHILS # BLD AUTO: 0.05 K/UL (ref 0–0.2)
BASOPHILS NFR BLD: 0.9 % (ref 0–1.9)
BILIRUB SERPL-MCNC: 0.3 MG/DL (ref 0.1–1)
BUN SERPL-MCNC: 13 MG/DL (ref 6–20)
CALCIUM SERPL-MCNC: 8.7 MG/DL (ref 8.7–10.5)
CHLORIDE SERPL-SCNC: 108 MMOL/L (ref 95–110)
CHOLEST SERPL-MCNC: 180 MG/DL (ref 120–199)
CHOLEST/HDLC SERPL: 2.6 {RATIO} (ref 2–5)
CO2 SERPL-SCNC: 24 MMOL/L (ref 23–29)
CREAT SERPL-MCNC: 0.7 MG/DL (ref 0.5–1.4)
DIFFERENTIAL METHOD BLD: ABNORMAL
EOSINOPHIL # BLD AUTO: 0.4 K/UL (ref 0–0.5)
EOSINOPHIL NFR BLD: 7.5 % (ref 0–8)
ERYTHROCYTE [DISTWIDTH] IN BLOOD BY AUTOMATED COUNT: 12.7 % (ref 11.5–14.5)
EST. GFR  (NO RACE VARIABLE): >60 ML/MIN/1.73 M^2
ESTIMATED AVG GLUCOSE: 100 MG/DL (ref 68–131)
GLUCOSE SERPL-MCNC: 97 MG/DL (ref 70–110)
HBA1C MFR BLD: 5.1 % (ref 4–5.6)
HCT VFR BLD AUTO: 41.5 % (ref 37–48.5)
HDLC SERPL-MCNC: 69 MG/DL (ref 40–75)
HDLC SERPL: 38.3 % (ref 20–50)
HGB BLD-MCNC: 12.8 G/DL (ref 12–16)
IMM GRANULOCYTES # BLD AUTO: 0 K/UL (ref 0–0.04)
IMM GRANULOCYTES NFR BLD AUTO: 0 % (ref 0–0.5)
LDLC SERPL CALC-MCNC: 98.6 MG/DL (ref 63–159)
LYMPHOCYTES # BLD AUTO: 1.3 K/UL (ref 1–4.8)
LYMPHOCYTES NFR BLD: 24.9 % (ref 18–48)
MCH RBC QN AUTO: 28.2 PG (ref 27–31)
MCHC RBC AUTO-ENTMCNC: 30.8 G/DL (ref 32–36)
MCV RBC AUTO: 91 FL (ref 82–98)
MONOCYTES # BLD AUTO: 0.5 K/UL (ref 0.3–1)
MONOCYTES NFR BLD: 9.3 % (ref 4–15)
NEUTROPHILS # BLD AUTO: 3.1 K/UL (ref 1.8–7.7)
NEUTROPHILS NFR BLD: 57.4 % (ref 38–73)
NONHDLC SERPL-MCNC: 111 MG/DL
NRBC BLD-RTO: 0 /100 WBC
PLATELET # BLD AUTO: 232 K/UL (ref 150–450)
PMV BLD AUTO: 11.9 FL (ref 9.2–12.9)
POTASSIUM SERPL-SCNC: 5 MMOL/L (ref 3.5–5.1)
PROT SERPL-MCNC: 7.1 G/DL (ref 6–8.4)
RBC # BLD AUTO: 4.54 M/UL (ref 4–5.4)
SODIUM SERPL-SCNC: 137 MMOL/L (ref 136–145)
T4 FREE SERPL-MCNC: 0.88 NG/DL (ref 0.71–1.51)
TRIGL SERPL-MCNC: 62 MG/DL (ref 30–150)
TSH SERPL DL<=0.005 MIU/L-ACNC: 1.22 UIU/ML (ref 0.4–4)
WBC # BLD AUTO: 5.35 K/UL (ref 3.9–12.7)

## 2024-05-03 PROCEDURE — 99213 OFFICE O/P EST LOW 20 MIN: CPT | Mod: PBBFAC,PO | Performed by: FAMILY MEDICINE

## 2024-05-03 PROCEDURE — 86003 ALLG SPEC IGE CRUDE XTRC EA: CPT | Performed by: FAMILY MEDICINE

## 2024-05-03 PROCEDURE — 85025 COMPLETE CBC W/AUTO DIFF WBC: CPT | Performed by: FAMILY MEDICINE

## 2024-05-03 PROCEDURE — 3074F SYST BP LT 130 MM HG: CPT | Mod: CPTII,,, | Performed by: FAMILY MEDICINE

## 2024-05-03 PROCEDURE — 84439 ASSAY OF FREE THYROXINE: CPT | Performed by: FAMILY MEDICINE

## 2024-05-03 PROCEDURE — 99395 PREV VISIT EST AGE 18-39: CPT | Mod: S$PBB,,, | Performed by: FAMILY MEDICINE

## 2024-05-03 PROCEDURE — 1159F MED LIST DOCD IN RCRD: CPT | Mod: CPTII,,, | Performed by: FAMILY MEDICINE

## 2024-05-03 PROCEDURE — 99999 PR PBB SHADOW E&M-EST. PATIENT-LVL III: CPT | Mod: PBBFAC,,, | Performed by: FAMILY MEDICINE

## 2024-05-03 PROCEDURE — 3078F DIAST BP <80 MM HG: CPT | Mod: CPTII,,, | Performed by: FAMILY MEDICINE

## 2024-05-03 PROCEDURE — 84443 ASSAY THYROID STIM HORMONE: CPT | Performed by: FAMILY MEDICINE

## 2024-05-03 PROCEDURE — 80061 LIPID PANEL: CPT | Performed by: FAMILY MEDICINE

## 2024-05-03 PROCEDURE — 3008F BODY MASS INDEX DOCD: CPT | Mod: CPTII,,, | Performed by: FAMILY MEDICINE

## 2024-05-03 PROCEDURE — 80053 COMPREHEN METABOLIC PANEL: CPT | Performed by: FAMILY MEDICINE

## 2024-05-03 PROCEDURE — 86003 ALLG SPEC IGE CRUDE XTRC EA: CPT | Mod: 59 | Performed by: FAMILY MEDICINE

## 2024-05-03 PROCEDURE — 83036 HEMOGLOBIN GLYCOSYLATED A1C: CPT | Performed by: FAMILY MEDICINE

## 2024-05-03 NOTE — PROGRESS NOTES
[facility]  Progress Note    SUBJECTIVE:     Chief Complaint   Patient presents with    Annual Exam       HPI   Anne Arenas  is a 38 y.o. female here for physical exam. Patient has no other new complaints/problems at this time.      Review of patient's allergies indicates:  No Known Allergies    Past Medical History:   Diagnosis Date    Hx of ectopic pregnancy 11/26/13    Ruptured right ectopic pregnancy     Past Surgical History:   Procedure Laterality Date    DILATION AND CURETTAGE OF UTERUS  2008    TAB    DILATION AND CURETTAGE OF UTERUS  2010    TAB    PELVIC LAPAROSCOPY Right 11/26/13    ruptured ectopic pregnancy    SALPINGECTOMY Right 11/26/13    Ruptured Ectopic pregnancy    SURGICAL REMOVAL OF MASS OF LOWER EXTREMITY Right 12/6/2022    Procedure: EXCISION, MASS, LOWER EXTREMITY-THIGH;  Surgeon: Ainbal Sanchez MD;  Location: [facility];  Service: General;  Laterality: Right;  RN PREOP 12/2     Family History   Problem Relation Name Age of Onset    Hypertension Maternal Grandmother      Hyperlipidemia Maternal Grandmother      No Known Problems Paternal Grandfather      No Known Problems Paternal Grandmother      No Known Problems Maternal Grandfather      No Known Problems Father      No Known Problems Mother      No Known Problems Sister      No Known Problems Brother      No Known Problems Maternal Aunt      No Known Problems Maternal Uncle      No Known Problems Paternal Aunt      No Known Problems Paternal Uncle      Cancer Neg Hx      Amblyopia Neg Hx      Blindness Neg Hx      Cataracts Neg Hx      Diabetes Neg Hx      Glaucoma Neg Hx      Macular degeneration Neg Hx      Retinal detachment Neg Hx      Strabismus Neg Hx      Stroke Neg Hx      Thyroid disease Neg Hx       Social History     Tobacco Use    Smoking status: Never    Smokeless tobacco: Never   Substance Use Topics    Alcohol use: No    Drug use: No        Review of Systems   Constitutional:  Negative for chills,  diaphoresis and fever.   HENT:  Negative for congestion, ear pain, hearing loss and sore throat.    Eyes:  Negative for photophobia and discharge.   Respiratory:  Negative for cough, shortness of breath and wheezing.    Cardiovascular:  Negative for chest pain and palpitations.   Gastrointestinal:  Negative for abdominal pain, blood in stool, constipation, diarrhea, nausea and vomiting.   Genitourinary:  Negative for dysuria and hematuria.   Musculoskeletal:  Negative for back pain, myalgias and neck pain.   Skin:  Negative for itching and rash.   Neurological:  Negative for dizziness, sensory change, focal weakness, weakness and headaches.   Endo/Heme/Allergies:  Negative for polydipsia.   All other systems reviewed and are negative.    OBJECTIVE:     Vitals:    05/03/24 0818   BP: 90/64   Pulse: 62   Temp: 98.1 °F (36.7 °C)     Body mass index is 20.22 kg/m².    Physical Exam  Constitutional:       General: She is not in acute distress.     Appearance: She is not diaphoretic.   HENT:      Head: Normocephalic and atraumatic.      Right Ear: Tympanic membrane and ear canal normal. No hemotympanum. Tympanic membrane is not perforated, erythematous or bulging.      Left Ear: Tympanic membrane and ear canal normal. No hemotympanum. Tympanic membrane is not perforated, erythematous or bulging.   Eyes:      Conjunctiva/sclera: Conjunctivae normal.      Pupils: Pupils are equal, round, and reactive to light.   Neck:      Thyroid: No thyromegaly.   Cardiovascular:      Rate and Rhythm: Normal rate and regular rhythm.      Heart sounds: Normal heart sounds. No murmur heard.     No friction rub. No gallop.   Pulmonary:      Effort: Pulmonary effort is normal. No respiratory distress.      Breath sounds: Normal breath sounds. No wheezing or rales.   Abdominal:      General: Bowel sounds are normal. There is no distension.      Palpations: Abdomen is soft.      Tenderness: There is no abdominal tenderness. There is no guarding  or rebound.   Musculoskeletal:         General: No tenderness. Normal range of motion.   Skin:     General: Skin is warm.      Findings: No erythema or rash.   Neurological:      Mental Status: She is alert and oriented to person, place, and time.         Old records were reviewed. Labs and/or images were independently reviewed.    ASSESSMENT     1. Routine physical examination    2. Encounter for allergy testing        PLAN:     Routine physical examination  -     CBC Auto Differential; Future; Expected date: 05/03/2024  -     Comprehensive Metabolic Panel; Future; Expected date: 05/03/2024  -     Hemoglobin A1C; Future; Expected date: 05/03/2024  -     TSH; Future; Expected date: 05/03/2024  -     T4, Free; Future; Expected date: 05/03/2024  -     Lipid Panel; Future; Expected date: 05/03/2024  -     We briefly discussed diet, exercise, and routine preventive exams. All questions and comments addressed.    Encounter for allergy testing  -     ALLERGEN ALMONDS; Future; Expected date: 05/03/2024  -     ALLERGEN BERMUDA GRASS; Future; Expected date: 05/03/2024  -     ALLERGEN CLAMS; Future; Expected date: 05/03/2024  -     ALLERGEN COCKROACH, AMERICAN IGE; Future; Expected date: 05/03/2024  -     ALLERGEN CRAB IGE; Future; Expected date: 05/03/2024  -     ALLERGEN CRAYFISH (FRESHWATER) IGE; Future; Expected date: 05/03/2024  -     ALLERGEN D FARINAE (DUST MITE); Future; Expected date: 05/03/2024  -     ALLERGEN DOG DANDER; Future; Expected date: 05/03/2024  -     ALLERGEN EGG WHITE; Future; Expected date: 05/03/2024  -     ALLERGEN EGG YOLK; Future; Expected date: 05/03/2024  -     ALLERGEN HAZELNUT; Future; Expected date: 05/03/2024  -     ALLERGEN LOBSTER; Future; Expected date: 05/03/2024  -     ALLERGEN MILK; Future; Expected date: 05/03/2024  -     ALLERGEN OYSTER IGE; Future; Expected date: 05/03/2024  -     ALLERGEN PEANUT; Future; Expected date: 05/03/2024  -     ALLERGEN RAGWEED, WESTERN IGE; Future;  Expected date: 05/03/2024  -     ALLERGEN SHRIMP; Future; Expected date: 05/03/2024  -     ALLERGEN SOYBEAN; Future; Expected date: 05/03/2024  -     ALLERGEN-SCALLOP; Future; Expected date: 05/03/2024      RTC ARGENTINA Ferreira MD  05/03/2024 8:30 AM

## 2024-05-06 LAB
ALMOND IGE QN: <0.1 KU/L
BERMUDA GRASS IGE QN: <0.1 KU/L
CLAM IGE QN: <0.1 KU/L
COW MILK IGE QN: <0.1 KU/L
CRAB IGE QN: <0.1 KU/L
CRAWFISH IGE QN: <0.1 KU/L
D FARINAE IGE QN: <0.1 KU/L
DEPRECATED ALMOND IGE RAST QL: NORMAL
DEPRECATED BERMUDA GRASS IGE RAST QL: NORMAL
DEPRECATED CLAM IGE RAST QL: NORMAL
DEPRECATED COW MILK IGE RAST QL: NORMAL
DEPRECATED CRAB IGE RAST QL: NORMAL
DEPRECATED CRAWFISH IGE RAST QL: NORMAL
DEPRECATED D FARINAE IGE RAST QL: NORMAL
DEPRECATED DOG DANDER IGE RAST QL: NORMAL
DEPRECATED EGG WHITE IGE RAST QL: NORMAL
DEPRECATED EGG YOLK IGE RAST QL: NORMAL
DEPRECATED HAZELNUT IGE RAST QL: NORMAL
DEPRECATED LOBSTER IGE RAST QL: NORMAL
DEPRECATED OYSTER IGE RAST QL: NORMAL
DEPRECATED PEANUT IGE RAST QL: NORMAL
DEPRECATED ROACH IGE RAST QL: NORMAL
DEPRECATED SCALLOP IGE RAST QL: NORMAL
DEPRECATED SHRIMP IGE RAST QL: NORMAL
DEPRECATED SOYBEAN IGE RAST QL: NORMAL
DEPRECATED WEST RAGWEED IGE RAST QL: NORMAL
DOG DANDER IGE QN: <0.1 KU/L
EGG WHITE IGE QN: <0.1 KU/L
EGG YOLK IGE QN: <0.1 KU/L
HAZELNUT IGE QN: <0.1 KU/L
LOBSTER IGE QN: <0.1 KU/L
OYSTER IGE QN: <0.1 KU/L
PEANUT IGE QN: <0.1 KU/L
ROACH IGE QN: <0.1 KU/L
SCALLOP IGE QN: <0.1 KU/L
SHRIMP IGE QN: <0.1 KU/L
SOYBEAN IGE QN: <0.1 KU/L
WEST RAGWEED IGE QN: <0.1 KU/L

## 2025-04-29 ENCOUNTER — TELEPHONE (OUTPATIENT)
Dept: FAMILY MEDICINE | Facility: CLINIC | Age: 40
End: 2025-04-29
Payer: MEDICAID

## 2025-04-29 NOTE — TELEPHONE ENCOUNTER
----- Message from Tech Brooke sent at 4/29/2025 11:24 AM CDT -----  Regarding: Sooner appointment request  .Type:  Sooner Appointment RequestPatient is requesting a sooner appointment.  Patient declined first available appointment listed as well as another facility and provider .  Patient will not accept being placed on the waitlist and is requesting a message be sent to doctor.Name of Caller: self When is the first available appointment? 05/19/2025Symptoms: breast pain Would the patient rather a call back or a response via [facility]? Call Best Call Back Number: .[phone] Information:

## 2025-04-29 NOTE — TELEPHONE ENCOUNTER
Spoke with patient and she informed me that she has appointment schedule for 5/19/2025 but will not be in town that day and is requesting a sooner appointment due to her feeling a hard uncomfortable lump in her breast. Reschedule appointment for 5/8/2025.

## 2025-05-08 ENCOUNTER — HOSPITAL ENCOUNTER (OUTPATIENT)
Dept: RADIOLOGY | Facility: HOSPITAL | Age: 40
Discharge: HOME | End: 2025-05-08
Attending: FAMILY MEDICINE
Payer: MEDICAID

## 2025-05-08 ENCOUNTER — OFFICE VISIT (OUTPATIENT)
Dept: FAMILY MEDICINE | Facility: CLINIC | Age: 40
End: 2025-05-08
Payer: MEDICAID

## 2025-05-08 ENCOUNTER — RESULTS FOLLOW-UP (OUTPATIENT)
Dept: FAMILY MEDICINE | Facility: CLINIC | Age: 40
End: 2025-05-08

## 2025-05-08 VITALS
HEIGHT: 61 IN | TEMPERATURE: 98 F | BODY MASS INDEX: 20.85 KG/M2 | SYSTOLIC BLOOD PRESSURE: 104 MMHG | DIASTOLIC BLOOD PRESSURE: 62 MMHG | WEIGHT: 110.44 LBS

## 2025-05-08 DIAGNOSIS — Z00.00 ROUTINE PHYSICAL EXAMINATION: Primary | ICD-10-CM

## 2025-05-08 DIAGNOSIS — G56.01 CARPAL TUNNEL SYNDROME ON RIGHT: ICD-10-CM

## 2025-05-08 DIAGNOSIS — N64.4 BREAST PAIN, RIGHT: ICD-10-CM

## 2025-05-08 DIAGNOSIS — Z12.31 ENCOUNTER FOR SCREENING MAMMOGRAM FOR BREAST CANCER: ICD-10-CM

## 2025-05-08 PROBLEM — R22.41 MASS OF RIGHT THIGH: Status: RESOLVED | Noted: 2022-12-06 | Resolved: 2025-05-08

## 2025-05-08 PROCEDURE — 77067 SCR MAMMO BI INCL CAD: CPT | Mod: 26,,, | Performed by: RADIOLOGY

## 2025-05-08 PROCEDURE — 99999 PR PBB SHADOW E&M-EST. PATIENT-LVL III: CPT | Mod: PBBFAC,,, | Performed by: FAMILY MEDICINE

## 2025-05-08 PROCEDURE — 99213 OFFICE O/P EST LOW 20 MIN: CPT | Mod: PBBFAC,PO | Performed by: FAMILY MEDICINE

## 2025-05-08 PROCEDURE — 77063 BREAST TOMOSYNTHESIS BI: CPT | Mod: TC

## 2025-05-08 PROCEDURE — 77063 BREAST TOMOSYNTHESIS BI: CPT | Mod: 26,,, | Performed by: RADIOLOGY

## 2025-05-08 RX ORDER — NAPROXEN 500 MG/1
500 TABLET ORAL 2 TIMES DAILY PRN
Qty: 60 TABLET | Refills: 1 | Status: SHIPPED | OUTPATIENT
Start: 2025-05-08

## 2025-05-08 NOTE — PROGRESS NOTES
[facility]  Progress Note    SUBJECTIVE:     Chief Complaint   Patient presents with    Annual Exam       HPI   Anne Arenas  is a 40 y.o. female here for physical exam. Has been having some on/off R breast pain. Had previous augmentation over 10 years ago. No discharge. Patient has no other new complaints/problems at this time.      Review of patient's allergies indicates:  No Known Allergies    Past Medical History:   Diagnosis Date    Hx of ectopic pregnancy 11/26/13    Ruptured right ectopic pregnancy     Past Surgical History:   Procedure Laterality Date    DILATION AND CURETTAGE OF UTERUS  2008    TAB    DILATION AND CURETTAGE OF UTERUS  2010    TAB    PELVIC LAPAROSCOPY Right 11/26/13    ruptured ectopic pregnancy    SALPINGECTOMY Right 11/26/13    Ruptured Ectopic pregnancy    SURGICAL REMOVAL OF MASS OF LOWER EXTREMITY Right 12/6/2022    Procedure: EXCISION, MASS, LOWER EXTREMITY-THIGH;  Surgeon: Anibal Sanchez MD;  Location: [facility];  Service: General;  Laterality: Right;  RN PREOP 12/2     Family History   Problem Relation Name Age of Onset    Hypertension Maternal Grandmother      Hyperlipidemia Maternal Grandmother      No Known Problems Paternal Grandfather      No Known Problems Paternal Grandmother      No Known Problems Maternal Grandfather      No Known Problems Father      No Known Problems Mother      No Known Problems Sister      No Known Problems Brother      No Known Problems Maternal Aunt      No Known Problems Maternal Uncle      No Known Problems Paternal Aunt      No Known Problems Paternal Uncle      Cancer Neg Hx      Amblyopia Neg Hx      Blindness Neg Hx      Cataracts Neg Hx      Diabetes Neg Hx      Glaucoma Neg Hx      Macular degeneration Neg Hx      Retinal detachment Neg Hx      Strabismus Neg Hx      Stroke Neg Hx      Thyroid disease Neg Hx       Social History[1]     Review of Systems   Constitutional:  Negative for chills and fever.   HENT: Negative.      Respiratory: Negative.  Negative for shortness of breath.    Cardiovascular: Negative.  Negative for chest pain.   Gastrointestinal: Negative.  Negative for abdominal pain, nausea and vomiting.   Genitourinary: Negative.    Musculoskeletal:         + R breast pain   Neurological:  Positive for tingling (R hand). Negative for tremors and headaches.   All other systems reviewed and are negative.    OBJECTIVE:     Vitals:    05/08/25 0809   BP: 104/62   Temp: 98 °F (36.7 °C)     Body mass index is 20.87 kg/m².    Physical Exam  Constitutional:       General: She is not in acute distress.     Appearance: She is not diaphoretic.   HENT:      Head: Normocephalic and atraumatic.      Nose: Nose normal.   Eyes:      Conjunctiva/sclera: Conjunctivae normal.   Cardiovascular:      Rate and Rhythm: Normal rate and regular rhythm.      Heart sounds: Normal heart sounds. No murmur heard.     No friction rub. No gallop.   Pulmonary:      Effort: Pulmonary effort is normal. No respiratory distress.      Breath sounds: Normal breath sounds. No wheezing or rales.   Abdominal:      Palpations: Abdomen is soft.   Skin:     General: Skin is warm.   Neurological:      Mental Status: She is alert and oriented to person, place, and time.      Comments: + median compression nerve test on R         Old records were reviewed. Labs and/or images were independently reviewed.    ASSESSMENT     1. Routine physical examination    2. Encounter for screening mammogram for breast cancer    3. Breast pain, right    4. Carpal tunnel syndrome on right        PLAN:     Routine physical examination  -     CBC Auto Differential; Future; Expected date: 05/08/2025  -     Comprehensive Metabolic Panel; Future; Expected date: 05/08/2025  -     Hemoglobin A1C; Future; Expected date: 05/08/2025  -     Lipid Panel; Future; Expected date: 05/08/2025  -     TSH; Future; Expected date: 05/08/2025  -     T4, Free; Future; Expected date: 05/08/2025  -     We  briefly discussed diet, exercise, and routine preventive exams. All questions and comments addressed.    Encounter for screening mammogram for breast cancer  -     Mammo Digital Screening Bilat; Future; Expected date: 05/08/2025  -     Mammo Digital Screening Bilat w/ Joe (XPD); Future; Expected date: 05/08/2025    Breast pain, right  -     US Breast Right Limited; Future; Expected date: 05/08/2025  -     ordered breast u/s.    Carpal tunnel syndrome on right  -     naproxen (NAPROSYN) 500 MG tablet; Take 1 tablet (500 mg total) by mouth 2 (two) times daily as needed.  Dispense: 60 tablet; Refill: 1  -     wrist brace. NSAIDs for now. Consider EMG study as next step.      60 minutes of total time spent on the encounter, which includes face to face time and non-face to face time preparing to see the patient (eg, review of tests), Obtaining and/or reviewing separately obtained history, Documenting clinical information in the electronic or other health record, Independently interpreting results (not separately reported), communicating results to the patient/family/caregiver, and/or Care coordination (not separately reported).     RTC ARGENTINA Ferreira MD  05/08/2025 8:27 AM           [1]   Social History  Tobacco Use    Smoking status: Never    Smokeless tobacco: Never   Substance Use Topics    Alcohol use: No    Drug use: No

## (undated) DEVICE — SUT VICRYL 4-0 18 P-3

## (undated) DEVICE — APPLICATOR CHLORAPREP ORN 26ML

## (undated) DEVICE — PACK ENDOSCOPY GENERAL

## (undated) DEVICE — Device

## (undated) DEVICE — GLOVE BIOGEL 7.5

## (undated) DEVICE — SYS CLSR DERMABOND PRINEO 22CM

## (undated) DEVICE — SOL 9P NACL IRR PIC IL

## (undated) DEVICE — SUT VICRYL 3-0 27 SH

## (undated) DEVICE — ELECTRODE REM PLYHSV RETURN 9

## (undated) DEVICE — CANISTER SUCTION 2 LTR

## (undated) DEVICE — SEE MEDLINE ITEM 157110